# Patient Record
Sex: FEMALE | Race: WHITE
[De-identification: names, ages, dates, MRNs, and addresses within clinical notes are randomized per-mention and may not be internally consistent; named-entity substitution may affect disease eponyms.]

---

## 2017-04-18 ENCOUNTER — HOSPITAL ENCOUNTER (OUTPATIENT)
Dept: HOSPITAL 17 - NEPC | Age: 57
Setting detail: OBSERVATION
LOS: 1 days | Discharge: HOME | End: 2017-04-19
Attending: INTERNAL MEDICINE | Admitting: INTERNAL MEDICINE
Payer: COMMERCIAL

## 2017-04-18 VITALS
SYSTOLIC BLOOD PRESSURE: 156 MMHG | OXYGEN SATURATION: 96 % | HEART RATE: 97 BPM | DIASTOLIC BLOOD PRESSURE: 93 MMHG | RESPIRATION RATE: 18 BRPM

## 2017-04-18 VITALS
DIASTOLIC BLOOD PRESSURE: 105 MMHG | SYSTOLIC BLOOD PRESSURE: 193 MMHG | TEMPERATURE: 98.6 F | OXYGEN SATURATION: 98 % | HEART RATE: 85 BPM | RESPIRATION RATE: 12 BRPM

## 2017-04-18 VITALS
SYSTOLIC BLOOD PRESSURE: 202 MMHG | DIASTOLIC BLOOD PRESSURE: 99 MMHG | RESPIRATION RATE: 20 BRPM | HEART RATE: 86 BPM | TEMPERATURE: 98.6 F | OXYGEN SATURATION: 95 %

## 2017-04-18 VITALS — OXYGEN SATURATION: 99 %

## 2017-04-18 VITALS
DIASTOLIC BLOOD PRESSURE: 96 MMHG | TEMPERATURE: 98.2 F | SYSTOLIC BLOOD PRESSURE: 185 MMHG | RESPIRATION RATE: 18 BRPM | OXYGEN SATURATION: 99 % | HEART RATE: 78 BPM

## 2017-04-18 VITALS — BODY MASS INDEX: 32.6 KG/M2 | WEIGHT: 202.83 LBS | HEIGHT: 66 IN

## 2017-04-18 VITALS
HEART RATE: 92 BPM | OXYGEN SATURATION: 97 % | RESPIRATION RATE: 18 BRPM | SYSTOLIC BLOOD PRESSURE: 152 MMHG | DIASTOLIC BLOOD PRESSURE: 95 MMHG

## 2017-04-18 VITALS
SYSTOLIC BLOOD PRESSURE: 151 MMHG | DIASTOLIC BLOOD PRESSURE: 90 MMHG | OXYGEN SATURATION: 98 % | RESPIRATION RATE: 18 BRPM | HEART RATE: 86 BPM

## 2017-04-18 VITALS — DIASTOLIC BLOOD PRESSURE: 88 MMHG | SYSTOLIC BLOOD PRESSURE: 144 MMHG

## 2017-04-18 VITALS — HEART RATE: 96 BPM

## 2017-04-18 DIAGNOSIS — Z79.82: ICD-10-CM

## 2017-04-18 DIAGNOSIS — Z88.2: ICD-10-CM

## 2017-04-18 DIAGNOSIS — Z88.1: ICD-10-CM

## 2017-04-18 DIAGNOSIS — G89.29: ICD-10-CM

## 2017-04-18 DIAGNOSIS — Z82.49: ICD-10-CM

## 2017-04-18 DIAGNOSIS — K21.9: ICD-10-CM

## 2017-04-18 DIAGNOSIS — M79.7: ICD-10-CM

## 2017-04-18 DIAGNOSIS — M54.2: ICD-10-CM

## 2017-04-18 DIAGNOSIS — I25.10: ICD-10-CM

## 2017-04-18 DIAGNOSIS — R07.2: Primary | ICD-10-CM

## 2017-04-18 DIAGNOSIS — E03.9: ICD-10-CM

## 2017-04-18 DIAGNOSIS — I10: ICD-10-CM

## 2017-04-18 DIAGNOSIS — Z95.5: ICD-10-CM

## 2017-04-18 DIAGNOSIS — Z88.5: ICD-10-CM

## 2017-04-18 DIAGNOSIS — Z88.8: ICD-10-CM

## 2017-04-18 LAB
ALP SERPL-CCNC: 70 U/L (ref 45–117)
ALT SERPL-CCNC: 24 U/L (ref 10–53)
ANION GAP SERPL CALC-SCNC: 12 MEQ/L (ref 5–15)
APTT BLD: 27.5 SEC (ref 24.3–30.1)
AST SERPL-CCNC: 20 U/L (ref 15–37)
BASOPHILS # BLD AUTO: 0.1 TH/MM3 (ref 0–0.2)
BASOPHILS NFR BLD: 1.1 % (ref 0–2)
BILIRUB SERPL-MCNC: 0.3 MG/DL (ref 0.2–1)
BUN SERPL-MCNC: 8 MG/DL (ref 7–18)
CHLORIDE SERPL-SCNC: 103 MEQ/L (ref 98–107)
CK MB SERPL-MCNC: 2.4 NG/ML (ref 0.5–3.6)
CK MB SERPL-MCNC: 2.7 NG/ML (ref 0.5–3.6)
CK MB SERPL-MCNC: 3 NG/ML (ref 0.5–3.6)
CK SERPL-CCNC: 126 U/L (ref 26–192)
CK SERPL-CCNC: 163 U/L (ref 26–192)
CK SERPL-CCNC: 168 U/L (ref 26–192)
EOSINOPHIL # BLD: 0.1 TH/MM3 (ref 0–0.4)
EOSINOPHIL NFR BLD: 2.2 % (ref 0–4)
ERYTHROCYTE [DISTWIDTH] IN BLOOD BY AUTOMATED COUNT: 13.4 % (ref 11.6–17.2)
GFR SERPLBLD BASED ON 1.73 SQ M-ARVRAT: 92 ML/MIN (ref 89–?)
HCO3 BLD-SCNC: 24.3 MEQ/L (ref 21–32)
HCT VFR BLD CALC: 44.9 % (ref 35–46)
HEMO FLAGS: (no result)
INR PPP: 0.9 RATIO
LYMPHOCYTES # BLD AUTO: 3.2 TH/MM3 (ref 1–4.8)
LYMPHOCYTES NFR BLD AUTO: 48.1 % (ref 9–44)
MAGNESIUM SERPL-MCNC: 2.3 MG/DL (ref 1.5–2.5)
MCH RBC QN AUTO: 30.3 PG (ref 27–34)
MCHC RBC AUTO-ENTMCNC: 34.3 % (ref 32–36)
MCV RBC AUTO: 88.2 FL (ref 80–100)
MONOCYTES NFR BLD: 6.3 % (ref 0–8)
NEUTROPHILS # BLD AUTO: 2.8 TH/MM3 (ref 1.8–7.7)
NEUTROPHILS NFR BLD AUTO: 42.3 % (ref 16–70)
PLATELET # BLD: 272 TH/MM3 (ref 150–450)
POTASSIUM SERPL-SCNC: 3.4 MEQ/L (ref 3.5–5.1)
PROTHROMBIN TIME: 10 SEC (ref 9.8–11.6)
RBC # BLD AUTO: 5.09 MIL/MM3 (ref 4–5.3)
SODIUM SERPL-SCNC: 139 MEQ/L (ref 136–145)
WBC # BLD AUTO: 6.6 TH/MM3 (ref 4–11)

## 2017-04-18 PROCEDURE — 82552 ASSAY OF CPK IN BLOOD: CPT

## 2017-04-18 PROCEDURE — 85730 THROMBOPLASTIN TIME PARTIAL: CPT

## 2017-04-18 PROCEDURE — 78452 HT MUSCLE IMAGE SPECT MULT: CPT

## 2017-04-18 PROCEDURE — 84484 ASSAY OF TROPONIN QUANT: CPT

## 2017-04-18 PROCEDURE — 84443 ASSAY THYROID STIM HORMONE: CPT

## 2017-04-18 PROCEDURE — 85025 COMPLETE CBC W/AUTO DIFF WBC: CPT

## 2017-04-18 PROCEDURE — 82550 ASSAY OF CK (CPK): CPT

## 2017-04-18 PROCEDURE — 80053 COMPREHEN METABOLIC PANEL: CPT

## 2017-04-18 PROCEDURE — 83735 ASSAY OF MAGNESIUM: CPT

## 2017-04-18 PROCEDURE — 71010: CPT

## 2017-04-18 PROCEDURE — G0378 HOSPITAL OBSERVATION PER HR: HCPCS

## 2017-04-18 PROCEDURE — 70450 CT HEAD/BRAIN W/O DYE: CPT

## 2017-04-18 PROCEDURE — 85610 PROTHROMBIN TIME: CPT

## 2017-04-18 PROCEDURE — 99285 EMERGENCY DEPT VISIT HI MDM: CPT

## 2017-04-18 PROCEDURE — 93005 ELECTROCARDIOGRAM TRACING: CPT

## 2017-04-18 PROCEDURE — A9502 TC99M TETROFOSMIN: HCPCS

## 2017-04-18 PROCEDURE — 93017 CV STRESS TEST TRACING ONLY: CPT

## 2017-04-18 RX ADMIN — NITROGLYCERIN PRN MG: 0.4 TABLET SUBLINGUAL at 18:57

## 2017-04-18 RX ADMIN — NITROGLYCERIN PRN MG: 0.4 TABLET SUBLINGUAL at 18:43

## 2017-04-18 RX ADMIN — Medication SCH ML: at 22:17

## 2017-04-18 RX ADMIN — PANTOPRAZOLE SCH MG: 40 TABLET, DELAYED RELEASE ORAL at 22:19

## 2017-04-18 RX ADMIN — ACETAMINOPHEN PRN MG: 500 TABLET ORAL at 20:12

## 2017-04-18 RX ADMIN — NITROGLYCERIN PRN MG: 0.4 TABLET SUBLINGUAL at 18:51

## 2017-04-18 RX ADMIN — METOPROLOL TARTRATE SCH MG: 25 TABLET, FILM COATED ORAL at 22:19

## 2017-04-18 RX ADMIN — LOSARTAN POTASSIUM SCH MG: 50 TABLET, FILM COATED ORAL at 22:18
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
## 2017-04-18 NOTE — PD
Physical Exam


Time Seen by Provider:  13:22


Narrative


57 year old female presents to the ED for evaluation chest pain. Pain is 7/10, 

constant. She took nitro two times today and slightly helped the symptoms, but 

the pain persisted. She contacted Dr. Mejia to advised she come to ED. She 

reports that her BP has been higher over the last two weeks with associated 

headaches and a "bubble in her chest sensation." But the consistency and pain 

with minimal results after nitro caused her to be concerned. She has had 

cardiac stenting in the past as well as angioplasty as recent as Dec. 2016.  

Denies any other recent illness, fever, or chills. Dr. Henrry Thompson is her 

cardiologist





Data


Data


Last Documented VS





Vital Signs








  Date Time  Temp Pulse Resp B/P Pulse Ox O2 Delivery O2 Flow Rate FiO2


 


4/18/17 13:17 98.6 85 12 193/105 98   











MDM


Medical Record Reviewed:  Yes


Supervised Visit with DILLON:  No


Narrative Course


57 year old female presents to ED for evaluation of chest pain.  Appears 

without distress. Pt is hypertensive. EKG completed in triage. Once a medical 

bed becomes available, she will be transferred.


Condition:  Stable








Flaca Galo Apr 18, 2017 13:27

## 2017-04-18 NOTE — RADRPT
EXAM DATE/TIME:  04/18/2017 16:01 

 

[facility] COMPARISON:     

CHEST SINGLE AP, December 05, 2016, 9:39.

 

                     

INDICATIONS :     

Chest pain.

                     

 

MEDICAL HISTORY :            

coronary artery disease   

 

SURGICAL HISTORY :     

Coronary artery stent.   

 

ENCOUNTER:     

Initial                                        

 

ACUITY:     

1 day      

 

PAIN SCORE:     

8/10

 

LOCATION:     

Bilateral chest 

 

FINDINGS:     

A single view of the chest demonstrates the lungs to be symmetrically aerated without evidence of mas
s, infiltrate or effusion.  The cardiomediastinal contours are stable with tortuosity of the descendi
ng thoracic aorta.  Osseous structures are intact.

 

CONCLUSION:     The lungs are clear.

 

 

 

 Robert Serrano MD on April 18, 2017 at 16:40           

Board Certified Radiologist.

 This report was verified electronically.

## 2017-04-18 NOTE — EKG
Date Performed: 04/18/2017       Time Performed: 13:32:18

 

PTAGE:      57 years

 

EKG:      Sinus rhythm 

 

 LOW QRS VOLTAGE IN PRECORDIAL LEADS INFERIOR MYOCARDIAL INFARCTION ABNORMAL ECG

 

PREVIOUS TRACING       : 12/05/2016 09.32 Compared to prior tracing no significant change

 

DOCTOR:   Ben Keith  Interpretating Date/Time  04/18/2017 23:37:26

## 2017-04-18 NOTE — PD
Physical Exam


Date Seen by Provider:  Apr 18, 2017


Time Seen by Provider:  14:30


Narrative


57-year-old female with history of previous coronary artery disease status post 

stenting, family history of coronary artery disease and MIs early in life, 

presents to the ER today because she states that she started having substernal 

chest pains which she rates at a 7 out of 10 at its worse, took nitroglycerin 

and the chest pain improved only slightly to a 6 out of 10.  She denies any 

vomiting, fevers, shortness of breath, chills, or other symptoms.  She states 

that this discomfort feels like a "bubble in her chest".  She does not obey 

exacerbating alleviating factors.





Modifying Factors: None


Associated Signs & Symptoms: Chest discomfort


Risk Factors: Cardiac history





Past medical history includes hypertension, previous CAD status post stenting, 

gastroesophageal reflux, thyroid disease





PHYSICAL EXAM:


GENERAL: Middle-aged white female patient currently none acute distress.  Awake

, alert, oriented 3.  Well-developed.


SKIN: Focused skin assessment warm/dry.


HEAD: Atraumatic. Normocephalic. 


EYES: Pupils equal and round. No scleral icterus. No injection or drainage. 


ENT: No nasal bleeding or discharge.  Mucous membranes pink and moist.


NECK: Trachea midline. No JVD. 


CARDIOVASCULAR: Regular rate and rhythm.  No murmur appreciated.  Pulses are 

present and equal bilaterally.


RESPIRATORY: No accessory muscle use. Clear to auscultation. Breath sounds 

equal bilaterally. 


GASTROINTESTINAL: Abdomen soft, non-tender, nondistended. Hepatic and splenic 

margins not palpable. 


MUSCULOSKELETAL: No obvious deformities. No clubbing.  No cyanosis.  No edema. 


NEUROLOGICAL: Awake and alert. No obvious cranial nerve deficits.  Motor 

grossly within normal limits. Normal speech.


PSYCHIATRIC: Appropriate mood and affect; insight and judgment normal.





Data


Data


Last Documented VS





Vital Signs








  Date Time  Temp Pulse Resp B/P Pulse Ox O2 Delivery O2 Flow Rate FiO2


 


4/18/17 15:26     99  2 


 


4/18/17 15:23      Nasal Cannula  


 


4/18/17 13:17 98.6 85 12 193/105    








Orders





 Electrocardiogram (4/18/17 13:25)


Ckmb (Isoenzyme) Profile (4/18/17 14:58)


Complete Blood Count With Diff (4/18/17 14:58)


Comprehensive Metabolic Panel (4/18/17 14:58)


Magnesium (Mg) (4/18/17 14:58)


Prothrombin Time / Inr (Pt) (4/18/17 14:58)


Act Partial Throm Time (Ptt) (4/18/17 14:58)


Troponin I (4/18/17 14:58)


Chest, Single Ap (4/18/17 14:58)


Ecg Monitoring (4/18/17 14:58)


Bilateral Bp Monitoring (4/18/17 14:58)


Iv Access Insert/Monitor (4/18/17 14:58)


Oximetry (4/18/17 14:58)


Oxygen Administration (4/18/17 14:58)


Sodium Chloride 0.9% Flush (Ns Flush) (4/18/17 15:00)


Aspirin (Aspirin) (4/18/17 15:15)


CKMB (4/18/17 15:10)


CKMB% (4/18/17 15:10)


Admit Order (Ed Use Only) (4/18/17 16:56)





Labs





 Laboratory Tests








Test 4/18/17





 15:10


 


White Blood Count 6.6 TH/MM3


 


Red Blood Count 5.09 MIL/MM3


 


Hemoglobin 15.4 GM/DL


 


Hematocrit 44.9 %


 


Mean Corpuscular Volume 88.2 FL


 


Mean Corpuscular Hemoglobin 30.3 PG


 


Mean Corpuscular Hemoglobin 34.3 %





Concent 


 


Red Cell Distribution Width 13.4 %


 


Platelet Count 272 TH/MM3


 


Mean Platelet Volume 6.9 FL


 


Neutrophils (%) (Auto) 42.3 %


 


Lymphocytes (%) (Auto) 48.1 %


 


Monocytes (%) (Auto) 6.3 %


 


Eosinophils (%) (Auto) 2.2 %


 


Basophils (%) (Auto) 1.1 %


 


Neutrophils # (Auto) 2.8 TH/MM3


 


Lymphocytes # (Auto) 3.2 TH/MM3


 


Monocytes # (Auto) 0.4 TH/MM3


 


Eosinophils # (Auto) 0.1 TH/MM3


 


Basophils # (Auto) 0.1 TH/MM3


 


CBC Comment DIFF FINAL 


 


Differential Comment  


 


Prothrombin Time 10.0 SEC


 


Prothromb Time International 0.9 RATIO





Ratio 


 


Activated Partial 27.5 SEC





Thromboplast Time 


 


Sodium Level 139 MEQ/L


 


Potassium Level 3.4 MEQ/L


 


Chloride Level 103 MEQ/L


 


Carbon Dioxide Level 24.3 MEQ/L


 


Anion Gap 12 MEQ/L


 


Blood Urea Nitrogen 8 MG/DL


 


Creatinine 0.66 MG/DL


 


Estimat Glomerular Filtration 92 ML/MIN





Rate 


 


Random Glucose 75 MG/DL


 


Calcium Level 8.9 MG/DL


 


Magnesium Level 2.3 MG/DL


 


Total Bilirubin 0.3 MG/DL


 


Aspartate Amino Transf 20 U/L





(AST/SGOT) 


 


Alanine Aminotransferase 24 U/L





(ALT/SGPT) 


 


Alkaline Phosphatase 70 U/L


 


Total Creatine Kinase 168 U/L


 


Creatine Kinase MB 3.0 NG/ML


 


Troponin I LESS THAN 0.02





 NG/ML


 


Total Protein 8.0 GM/DL


 


Albumin 4.1 GM/DL











[facility]


Medical Record Reviewed:  Yes


Supervised Visit with DILLON:  No


Interpretation(s)


EKG shows NSR, no ST elevation or depression, and no arrhythmias.  No  

significant T-wave inversions.








Laboratory Tests








Test 4/18/17





 15:10


 


Hemoglobin 15.4 GM/DL





 (11.6-15.3)


 


Mean Platelet Volume 6.9 FL





 (7.0-11.0)


 


Lymphocytes (%) (Auto) 48.1 %





 (9.0-44.0)


 


Potassium Level 3.4 MEQ/L





 (3.5-5.1)


 


Troponin I LESS THAN 0.02





 NG/ML





 (0.02-0.05)








Last 24 hours Impressions








Chest X-Ray 4/18/17 3288 Signed





Impressions: 





 Service Date/Time:  Tuesday, April 18, 2017 16:01 - CONCLUSION: The lungs are 





 clear.     Robert Serrano MD 








Differential Diagnosis


ACS versus dysrhythmias versus anxiety versus GERD


Narrative Course


Cardiac enzymes and chest x-ray was unremarkable.  Patient is not in distress 

in the ER.  However, considering patient's history, my plan would be to admit 

the patient for further evaluation.  Planning to admit to chest pain center for 

further evaluation.


Diagnosis





 Primary Impression:  


 Chest pain





Admitting Information


Admitting Physician Requests:  Admit


Condition:  Stable








Oscar Lerma MD Apr 18, 2017 17:04

## 2017-04-19 VITALS
DIASTOLIC BLOOD PRESSURE: 92 MMHG | SYSTOLIC BLOOD PRESSURE: 170 MMHG | RESPIRATION RATE: 20 BRPM | OXYGEN SATURATION: 96 % | TEMPERATURE: 99 F | HEART RATE: 77 BPM

## 2017-04-19 VITALS
HEART RATE: 84 BPM | SYSTOLIC BLOOD PRESSURE: 148 MMHG | TEMPERATURE: 97.9 F | OXYGEN SATURATION: 95 % | RESPIRATION RATE: 20 BRPM | DIASTOLIC BLOOD PRESSURE: 92 MMHG

## 2017-04-19 VITALS
HEART RATE: 76 BPM | OXYGEN SATURATION: 96 % | RESPIRATION RATE: 20 BRPM | DIASTOLIC BLOOD PRESSURE: 93 MMHG | TEMPERATURE: 98.7 F | SYSTOLIC BLOOD PRESSURE: 177 MMHG

## 2017-04-19 VITALS — HEART RATE: 80 BPM

## 2017-04-19 VITALS
RESPIRATION RATE: 20 BRPM | TEMPERATURE: 97.3 F | HEART RATE: 76 BPM | OXYGEN SATURATION: 95 % | DIASTOLIC BLOOD PRESSURE: 92 MMHG | SYSTOLIC BLOOD PRESSURE: 170 MMHG

## 2017-04-19 VITALS — HEART RATE: 85 BPM

## 2017-04-19 RX ADMIN — METOPROLOL TARTRATE SCH MG: 25 TABLET, FILM COATED ORAL at 08:39

## 2017-04-19 RX ADMIN — ACETAMINOPHEN PRN MG: 500 TABLET ORAL at 06:02

## 2017-04-19 RX ADMIN — LOSARTAN POTASSIUM SCH MG: 50 TABLET, FILM COATED ORAL at 08:39

## 2017-04-19 RX ADMIN — Medication SCH ML: at 08:43

## 2017-04-19 RX ADMIN — PANTOPRAZOLE SCH MG: 40 TABLET, DELAYED RELEASE ORAL at 08:39

## 2017-04-19 NOTE — TR
Date Performed: 04/19/2017       Time Performed: 10:04:05

 

DOCTOR:      Roberto Anguiano 

 

DRUG LIST:     

CLINICAL HISTORY:      ANGINA

REASON FOR TEST:      Angina

REASON FOR ENDING:     

OBSERVATION:     

CONCLUSION:      Lexiscan stress test was performed under standard four minute protocol.  Radionuclid
e was injected one minute prior to ending the test. No electrocardiographic abormalities were present
 to suggest ischemia. Nuclear imaging and interpretation are pending.

COMMENTS:

## 2017-04-19 NOTE — HHI.DCPOC
Discharge Care Plan


Diagnosis:  


(1) Chest pain


(2) Coronary artery disease


(3) H/O heart artery stent


(4) Hypertension


(5) Hypothyroidism


Goals to Promote Your Health


DISCUSS THE NEED FOR CHOLESTEROL MEDICATIONS WITH YOUR DOCTOR.





* To prevent worsening of your condition and complications


* To maintain your health at the optimal level


Directions to Meet Your Goals


*** Take your medications as prescribed


*** Follow your dietary instruction


*** Follow activity as directed








*** Keep your appointments as scheduled


*** Take your immunizations and boosters as scheduled


*** If your symptoms worsen call your PCP, if no PCP go to Urgent Care Center 

or Emergency Room***


*** Smoking is Dangerous to Your Health. Avoid second hand smoke***


***Call the 24-hour hour crisis hotline for domestic abuse at 1-425.689.1231***








Willy Leone Apr 19, 2017 12:04

## 2017-04-19 NOTE — EKG
Date Performed: 04/18/2017       Time Performed: 18:01:30

 

PTAGE:      57 years

 

EKG:      Sinus rhythm 

 

 MODERATE VOLTAGE CRITERIA FOR LVH, CONSIDER NORMAL VARIANT BORDERLINE ECG

 

PREVIOUS TRACING       : 04/18/2017 13.32 Since previous tracing, no significant change noted

 

DOCTOR:   Roberto Anguiano  Interpretating Date/Time  04/19/2017 12:52:30

## 2017-04-19 NOTE — RADRPT
EXAM DATE/TIME:  04/19/2017 09:14 

 

[facility] COMPARISON:     

No previous studies available for comparison.

 

 

INDICATIONS :     

Cephalgia.

                      

 

RADIATION DOSE:     

35.01 CTDIvol (mGy) 

 

 

 

MEDICAL HISTORY :     

Hypertension. Diabetes mellitus type 1. Dizziness

 

SURGICAL HISTORY :       

 

ENCOUNTER:      

Initial

 

ACUITY:      

1 day

 

PAIN SCALE:      

1/10

 

LOCATION:         

 

TECHNIQUE:     

Multiple contiguous axial images were obtained of the head.  Using automated exposure control and adj
ustment of the mA and/or kV according to patient size, radiation dose was kept as low as reasonably a
chievable to obtain optimal diagnostic quality images. 

 

FINDINGS:     

 

CEREBRUM:     

The ventricles are normal for age.  No evidence of midline shift, mass lesion, hemorrhage or acute in
farction.  No extra-axial fluid collections are seen.

 

POSTERIOR FOSSA:     

The cerebellum and brainstem are intact.  The 4th ventricle is midline.  The cerebellopontine angle i
s unremarkable.

 

EXTRACRANIAL:     

The visualized portion of the orbits is intact.

 

SKULL:     

The calvaria is intact.  No evidence of skull fracture.

 

CONCLUSION:     

Negative noncontrast CT brain.

 

 

 

 Robert Serrano MD on April 19, 2017 at 9:24           

Board Certified Radiologist.

 This report was verified electronically.

## 2017-04-19 NOTE — EKG
Date Performed: 04/18/2017       Time Performed: 21:08:08

 

PTAGE:      57 years

 

EKG:      Sinus rhythm 

 

 MINIMAL VOLTAGE CRITERIA FOR LVH, CONSIDER NORMAL VARIANT NONSPECIFIC T-WAVE ABNORMALITY BORDERLINE 
ECG 

 

NO PREVIOUS TRACING            

 

DOCTOR:   Roberto Anguiano  Interpretating Date/Time  04/19/2017 12:52:05

## 2017-04-19 NOTE — RADRPT
EXAM DATE/TIME:  2017 09:16 

 

[facility] COMPARISON:     

No previous studies available for comparison.

 

 

INDICATIONS :     

Left sided chest pain. Angina. Coronary artery disease.

                           

 

DOSE:     

26.2 mCi Tc99m Myoview at stress.

                     8.5 mCi Tc99m Myoview at rest.

                     0.4 mg Lexiscan

                       

 

 

STRESS SYMPTOMS:      

Shortness of breath with chest pain and nausea.

                       

 

 

EJECTION FRACTION:       

65%

                       

 

MEDICAL HISTORY :     

Hypertension.   

 

SURGICAL HISTORY :      

Coronary artery stent.  section. Inguinal hernia repair. Discectomy.

 

ENCOUNTER:     

Initial

 

ACUITY:     

1 day

 

PAIN SCALE:     

2/10

 

LOCATION:      

Left chest 

 

TECHNIQUE:     

The patient underwent pharmacologic stress with infusion of prescribed dose.  Continuous ECG tracing 
was monitored during stress.  Gated SPECT imaging was performed after stress and conventional SPECT i
maging was performed at rest.  The examination was performed on a SPECT/CT scanner, both attenuation 
and non-corrected datasets were reviewed.

 

FINDINGS:     

 

DISTRIBUTION:     

The maximum perfused segment at stress is in the anterior wall.

 

PERFUSION STUDY:     

There is a small size mild severity perfusion defect seen in the apical segment of the lateral wall w
ith decrease in perfusion approximately 30%.  Review of raw data images demonstrates a different tamanna
palmira of breast attenuation to the apical segment between rest and stress in the changed appearance cou
ld be due to differential attenuation effects.  No other perfusion defects seen.  The summed stress s
core is one.

 

GATED STUDY:     

There is intact wall motion and thickening without hypokinetic or dyskinetic segments. 

 

CONCLUSION:     

1. No definite evidence of stress-induced ischemia.  Small mild decrease in perfusion to the apical l
ateral wall is probably due to breast attenuation effects.

2. Intact wall motion 65% ejection fraction.

 

RISK CATEGORY:     Low (<1% Annual Mortality Rate)

 

 

 

 

 Robert Serrano MD on 2017 at 11:34           

Board Certified Radiologist.

 This report was verified electronically.

## 2017-05-15 ENCOUNTER — HOSPITAL ENCOUNTER (OUTPATIENT)
Dept: HOSPITAL 17 - NEPE | Age: 57
Setting detail: OBSERVATION
LOS: 1 days | Discharge: HOME | End: 2017-05-16
Attending: HOSPITALIST | Admitting: HOSPITALIST
Payer: COMMERCIAL

## 2017-05-15 VITALS
SYSTOLIC BLOOD PRESSURE: 135 MMHG | HEART RATE: 109 BPM | RESPIRATION RATE: 20 BRPM | OXYGEN SATURATION: 96 % | DIASTOLIC BLOOD PRESSURE: 89 MMHG | TEMPERATURE: 98.9 F

## 2017-05-15 VITALS
RESPIRATION RATE: 16 BRPM | DIASTOLIC BLOOD PRESSURE: 81 MMHG | HEART RATE: 90 BPM | SYSTOLIC BLOOD PRESSURE: 136 MMHG | OXYGEN SATURATION: 96 %

## 2017-05-15 VITALS
TEMPERATURE: 100.1 F | DIASTOLIC BLOOD PRESSURE: 68 MMHG | OXYGEN SATURATION: 97 % | HEART RATE: 98 BPM | SYSTOLIC BLOOD PRESSURE: 138 MMHG | RESPIRATION RATE: 20 BRPM

## 2017-05-15 VITALS
DIASTOLIC BLOOD PRESSURE: 73 MMHG | SYSTOLIC BLOOD PRESSURE: 144 MMHG | RESPIRATION RATE: 17 BRPM | HEART RATE: 89 BPM | OXYGEN SATURATION: 96 %

## 2017-05-15 VITALS
DIASTOLIC BLOOD PRESSURE: 74 MMHG | SYSTOLIC BLOOD PRESSURE: 131 MMHG | OXYGEN SATURATION: 98 % | RESPIRATION RATE: 16 BRPM | HEART RATE: 88 BPM

## 2017-05-15 VITALS — OXYGEN SATURATION: 96 %

## 2017-05-15 VITALS — HEART RATE: 71 BPM

## 2017-05-15 DIAGNOSIS — E78.5: ICD-10-CM

## 2017-05-15 DIAGNOSIS — I10: ICD-10-CM

## 2017-05-15 DIAGNOSIS — E55.9: ICD-10-CM

## 2017-05-15 DIAGNOSIS — J30.9: ICD-10-CM

## 2017-05-15 DIAGNOSIS — M79.7: ICD-10-CM

## 2017-05-15 DIAGNOSIS — I25.10: ICD-10-CM

## 2017-05-15 DIAGNOSIS — R42: ICD-10-CM

## 2017-05-15 DIAGNOSIS — R59.1: ICD-10-CM

## 2017-05-15 DIAGNOSIS — E03.9: ICD-10-CM

## 2017-05-15 DIAGNOSIS — R55: ICD-10-CM

## 2017-05-15 DIAGNOSIS — R06.02: ICD-10-CM

## 2017-05-15 DIAGNOSIS — F41.9: ICD-10-CM

## 2017-05-15 DIAGNOSIS — E66.9: ICD-10-CM

## 2017-05-15 DIAGNOSIS — R00.2: Primary | ICD-10-CM

## 2017-05-15 DIAGNOSIS — R07.9: ICD-10-CM

## 2017-05-15 DIAGNOSIS — Z79.82: ICD-10-CM

## 2017-05-15 DIAGNOSIS — K21.9: ICD-10-CM

## 2017-05-15 DIAGNOSIS — M54.12: ICD-10-CM

## 2017-05-15 DIAGNOSIS — M48.02: ICD-10-CM

## 2017-05-15 DIAGNOSIS — Z98.1: ICD-10-CM

## 2017-05-15 LAB
ALP SERPL-CCNC: 59 U/L (ref 45–117)
ALT SERPL-CCNC: 30 U/L (ref 10–53)
ANION GAP SERPL CALC-SCNC: 8 MEQ/L (ref 5–15)
APTT BLD: 24.5 SEC (ref 24.3–30.1)
AST SERPL-CCNC: 23 U/L (ref 15–37)
BACTERIA #/AREA URNS HPF: (no result) /HPF
BASOPHILS # BLD AUTO: 0 TH/MM3 (ref 0–0.2)
BASOPHILS NFR BLD: 0.3 % (ref 0–2)
BILIRUB SERPL-MCNC: 0.4 MG/DL (ref 0.2–1)
BUN SERPL-MCNC: 9 MG/DL (ref 7–18)
CHLORIDE SERPL-SCNC: 107 MEQ/L (ref 98–107)
CK SERPL-CCNC: 84 U/L (ref 26–192)
COLOR UR: YELLOW
COMMENT (UR): (no result)
CULTURE IF INDICATED: (no result)
EOSINOPHIL # BLD: 0 TH/MM3 (ref 0–0.4)
EOSINOPHIL NFR BLD: 0.7 % (ref 0–4)
ERYTHROCYTE [DISTWIDTH] IN BLOOD BY AUTOMATED COUNT: 12.9 % (ref 11.6–17.2)
GFR SERPLBLD BASED ON 1.73 SQ M-ARVRAT: 111 ML/MIN (ref 89–?)
GLUCOSE UR STRIP-MCNC: (no result) MG/DL
HCO3 BLD-SCNC: 25.9 MEQ/L (ref 21–32)
HCT VFR BLD CALC: 37.9 % (ref 35–46)
HEMO FLAGS: (no result)
HGB UR QL STRIP: (no result)
INR PPP: 0.9 RATIO
KETONES UR STRIP-MCNC: (no result) MG/DL
LYMPHOCYTES # BLD AUTO: 1.7 TH/MM3 (ref 1–4.8)
LYMPHOCYTES NFR BLD AUTO: 25.8 % (ref 9–44)
MAGNESIUM SERPL-MCNC: 2.3 MG/DL (ref 1.5–2.5)
MCH RBC QN AUTO: 29.6 PG (ref 27–34)
MCHC RBC AUTO-ENTMCNC: 33.7 % (ref 32–36)
MCV RBC AUTO: 87.8 FL (ref 80–100)
MONOCYTES NFR BLD: 6.1 % (ref 0–8)
NEUTROPHILS # BLD AUTO: 4.5 TH/MM3 (ref 1.8–7.7)
NEUTROPHILS NFR BLD AUTO: 67.1 % (ref 16–70)
NITRITE UR QL STRIP: (no result)
PLATELET # BLD: 311 TH/MM3 (ref 150–450)
POTASSIUM SERPL-SCNC: 3.6 MEQ/L (ref 3.5–5.1)
PROTHROMBIN TIME: 10.3 SEC (ref 9.8–11.6)
RBC # BLD AUTO: 4.32 MIL/MM3 (ref 4–5.3)
SODIUM SERPL-SCNC: 141 MEQ/L (ref 136–145)
SP GR UR STRIP: 1.01 (ref 1–1.03)
T3FREE SERPL-MCNC: 2.16 PG/ML (ref 2.18–3.98)
T4 FREE SERPL-MCNC: 1.23 NG/DL (ref 0.76–1.46)
WBC # BLD AUTO: 6.8 TH/MM3 (ref 4–11)

## 2017-05-15 PROCEDURE — 74177 CT ABD & PELVIS W/CONTRAST: CPT

## 2017-05-15 PROCEDURE — 85025 COMPLETE CBC W/AUTO DIFF WBC: CPT

## 2017-05-15 PROCEDURE — 99285 EMERGENCY DEPT VISIT HI MDM: CPT

## 2017-05-15 PROCEDURE — 84484 ASSAY OF TROPONIN QUANT: CPT

## 2017-05-15 PROCEDURE — 81001 URINALYSIS AUTO W/SCOPE: CPT

## 2017-05-15 PROCEDURE — 93005 ELECTROCARDIOGRAM TRACING: CPT

## 2017-05-15 PROCEDURE — 80053 COMPREHEN METABOLIC PANEL: CPT

## 2017-05-15 PROCEDURE — G0378 HOSPITAL OBSERVATION PER HR: HCPCS

## 2017-05-15 PROCEDURE — 85610 PROTHROMBIN TIME: CPT

## 2017-05-15 PROCEDURE — 84443 ASSAY THYROID STIM HORMONE: CPT

## 2017-05-15 PROCEDURE — 85730 THROMBOPLASTIN TIME PARTIAL: CPT

## 2017-05-15 PROCEDURE — 83735 ASSAY OF MAGNESIUM: CPT

## 2017-05-15 PROCEDURE — 84481 FREE ASSAY (FT-3): CPT

## 2017-05-15 PROCEDURE — 71010: CPT

## 2017-05-15 PROCEDURE — 82550 ASSAY OF CK (CPK): CPT

## 2017-05-15 PROCEDURE — 71275 CT ANGIOGRAPHY CHEST: CPT

## 2017-05-15 PROCEDURE — 84439 ASSAY OF FREE THYROXINE: CPT

## 2017-05-15 PROCEDURE — 85379 FIBRIN DEGRADATION QUANT: CPT

## 2017-05-15 RX ADMIN — METOPROLOL TARTRATE SCH MG: 25 TABLET, FILM COATED ORAL at 19:54

## 2017-05-15 RX ADMIN — PANTOPRAZOLE SCH MG: 40 TABLET, DELAYED RELEASE ORAL at 19:55

## 2017-05-15 RX ADMIN — LOSARTAN POTASSIUM SCH MG: 50 TABLET, FILM COATED ORAL at 19:55

## 2017-05-15 NOTE — RADRPT
EXAM DATE/TIME:  05/15/2017 15:33 

 

[facility] COMPARISON:     

No previous studies available for comparison.

 

 

INDICATIONS :     

Left sided chest pain for one month.

                      

 

IV CONTRAST:     

50 cc Omnipaque 350 (iohexol) IV 

 

 

RADIATION DOSE:     

12.88 CTDIvol (mGy) 

 

 

MEDICAL HISTORY :     

Hypertension.  coronary artery disease

 

SURGICAL HISTORY :       

cardiac catheterization

 

ENCOUNTER:      

Initial

 

ACUITY:      

1 month

 

PAIN SCALE:      

5/10

 

LOCATION:       

Left chest 

 

TECHNIQUE:     

Volumetric scanning of the chest was performed using a pulmonary embolism protocol MIP images were re
constructed.  Using automated exposure control and adjustment of the mA and/or kV according to patien
t size, radiation dose was kept as low as reasonably achievable to obtain optimal diagnostic quality 
images. 

 

FINDINGS:     

There are no suspicious lung lesions identified.

The heart is minimally enlarged.

There is no axillary or mediastinal adenopathy

There is no evidence for central pulmonary emboli

Stent is present in the LAD

The portion of the liver and spleen identify are free of focal defects.

 

CONCLUSION:     Negative for pulmonary emboli.  History of cardiac stent.  

 

 

 Maged Llanos MD FACR on May 15, 2017 at 15:44           

Board Certified Radiologist.

 This report was verified electronically.

## 2017-05-15 NOTE — HHI.HP
HPI


Service


[facility]


Primary Care Physician


Pranay Hernandez M.D.


Admission Diagnosis


palpitation


Chief Complaint:  


palpitation


Travel History


International Travel<30 Days:  No


Contact w/Intl Traveler <30 Da:  No


Traveled to Known Affected Are:  No


History of Present Illness


Pt is 58 yo with cad/lad stent who presents with recurrent episodes of 

palpitations


and then chest pressure. she usually takes ntg for this with some relief. She 

had


[facility] x 2 in [id number] with lad ganesh. Then in 12/16 [facility] patent coronaries and stent. 

Last


month admitted to [facility] and had lexiscan. This was felt to show artifact in 

apical region


and less likely ischemia. Pt says these sx's have been on/off and her pcp 

evaluated


her adrenal. She also mentions that endocrinology is searching for the answers


and she is scheduled for thyroid u/s. Pt says bp meds recently increased.


She was not comfortably going home tonight. denies syncope but did have


dizziness during the palpitations.





Review of Systems


Other


palpitation with some cp


dizziness





Past Family Social History


Past Medical History


cad, lad ganesh 2014


[facility] 12/16 neg for obstruction


lexiscan 4/17. no definite ischemia


hypothyroidism


umbilical hernia surgery


c section


c5/6 spinal stenosis s/p lami/fusion


Reported Medications





Hydralazine (Hydralazine HCl) 25 Mg Tab 25 Mg PO TID


     Take with a meal


Gabapentin 600 Mg Tab 1,200 Mg PO HS


Gabapentin 600 Mg Tab 600 Mg PO DAILY


Losartan (Losartan Potassium) 50 Mg Tab 50 Mg PO BID


Tylenol Extra Strength (Acetaminophen) 500 Mg Tab 1,000 Mg PO Q4-6H PRN


Metoprolol Tartrate 25 Mg Tab 75 Mg PO BID


Levothyroxine (Levothyroxine Sodium) 75 Mcg Tab 75 Mcg PO DAILY


Lansoprazole 30 Mg Capdr 30 Mg PO BID


Flonase Nasal Spray (Fluticasone Nasal Spray) 50 Mcg/Act Spray 2 Spray EACH 

NARE DAILY PRN


Allegra Allergy (Fexofenadine HCl) 180 Mg Tab 180 Mg PO DAILY PRN


Aviane (Levonorgestrel-Ethinyl Estradiol) 0.1-20 Mg-Mcg Tab 1 Tab PO DAILY


Aspirin Adult Low Strength (Aspirin) 81 Mg Tabdr 81 Mg PO HS


Allergies:  


Coded Allergies:  


     Cipro (Unverified  Allergy, Severe, 5/15/17)


     Darvocet-N 100 (Unverified  Allergy, Severe, 5/15/17)


     Procardia (Unverified  Allergy, Severe, 5/15/17)


     Sulfa (Verified  Allergy, Intermediate, HIVES, 5/15/17)


     Zanaflex (Verified  Allergy, Intermediate, VOMITING, 5/15/17)


Family History


nc


Social History


no etoh/tob





Physical Exam


Vital Signs


heent neg


heart reg


lung cta


abd s/nt


ext no edema


 Vital Signs








  Date Time  Temp Pulse Resp B/P Pulse Ox O2 Delivery O2 Flow Rate FiO2


 


5/15/17 16:41  90 16 136/81 96   


 


5/15/17 14:32  88 16 131/74 98   


 


5/15/17 12:11     96 Room Air  


 


5/15/17 12:11  92    Room Air  


 


5/15/17 11:15  89 17 144/73 96 Room Air  


 


5/15/17 10:45 98.9 109 20 135/89 96 Room Air  








Laboratory





Laboratory Tests








Test 5/15/17





 11:30


 


White Blood Count 6.8 


 


Red Blood Count 4.32 


 


Hemoglobin 12.8 


 


Hematocrit 37.9 


 


Mean Corpuscular Volume 87.8 


 


Mean Corpuscular Hemoglobin 29.6 


 


Mean Corpuscular Hemoglobin 33.7 





Concent 


 


Red Cell Distribution Width 12.9 


 


Platelet Count 311 


 


Mean Platelet Volume 7.0 


 


Neutrophils (%) (Auto) 67.1 


 


Lymphocytes (%) (Auto) 25.8 


 


Monocytes (%) (Auto) 6.1 


 


Eosinophils (%) (Auto) 0.7 


 


Basophils (%) (Auto) 0.3 


 


Neutrophils # (Auto) 4.5 


 


Lymphocytes # (Auto) 1.7 


 


Monocytes # (Auto) 0.4 


 


Eosinophils # (Auto) 0.0 


 


Basophils # (Auto) 0.0 


 


CBC Comment DIFF FINAL 


 


Differential Comment  


 


Prothrombin Time 10.3 


 


Prothromb Time International 0.9 





Ratio 


 


Activated Partial 24.5 





Thromboplast Time 


 


D-Dimer Quantitative (PE/DVT) 1.09 


 


Urine Color YELLOW 


 


Urine Turbidity CLEAR 


 


Urine pH 7.0 


 


Urine Specific Gravity 1.008 


 


Urine Protein NEG 


 


Urine Glucose (UA) NEG 


 


Urine Ketones NEG 


 


Urine Occult Blood NEG 


 


Urine Nitrite NEG 


 


Urine Bilirubin NEG 


 


Urine Urobilinogen LESS THAN 2.0 


 


Urine Leukocyte Esterase NEG 


 


Urine RBC LESS THAN 1 


 


Urine WBC LESS THAN 1 


 


Urine Bacteria RARE 


 


Microscopic Urinalysis Comment CULT NOT





 INDICATED


 


Sodium Level 141 


 


Potassium Level 3.6 


 


Chloride Level 107 


 


Carbon Dioxide Level 25.9 


 


Anion Gap 8 


 


Blood Urea Nitrogen 9 


 


Creatinine 0.56 


 


Estimat Glomerular Filtration 111 





Rate 


 


Random Glucose 97 


 


Calcium Level 8.6 


 


Magnesium Level 2.3 


 


Total Bilirubin 0.4 


 


Aspartate Amino Transf 23 





(AST/SGOT) 


 


Alanine Aminotransferase 30 





(ALT/SGPT) 


 


Alkaline Phosphatase 59 


 


Total Protein 6.8 


 


Albumin 3.6 


 


Free Thyroxine 1.23 


 


Free Triiodothyronine (T3) 2.16 





pg/dL 


 


Thyroid Stimulating Hormone 0.709 





3rd Gen 








Result Diagram:  


5/15/17 1130                                                                   

             5/15/17 1130








Assessment and Plan


Problem List:  


(1) Palpitations


Status:  Acute


Plan:  Pt is 58 yo with cad/lad ganesh 2014


[facility] 12/16. no obstruction


lexiscan 4/17 no definite ischemia


c/o palpitations-recurrent with cp





consult her cardiologist


telemetry overnight to monitor for arrhythmia


?30 holter..?loop recorder


cont home meds





(2) Coronary artery disease


Status:  Chronic


(3) Hypertension


Status:  Chronic


(4) Hypothyroidism


Status:  Chronic








Sergio Aguilar MD May 15, 2017 18:03

## 2017-05-15 NOTE — PD
HPI


Chief Complaint:  Cardiac Complaint


Time Seen by Provider:  11:22


Travel History


International Travel<30 days:  No


Contact w/Intl Traveler<30days:  No


Traveled to known affect area:  No





History of Present Illness


HPI


57-year-old female patient with history of hypertension, CAD with stenting, 

recent admission for cardiac evaluation of syncope and chest pains, presents 

back to the ER today because she has had 2 days history of dizziness, chest 

discomfort, feels like she is about to pass out, and states she had flulike 

symptoms last week as well.  She has been coughing with green phlegm, having 

fevers but states the fevers have been settling down.  She denies any current 

vomiting, diarrhea,, or any other symptoms.





Modifying Factors: None


Associated Signs & Symptoms: Chest discomfort, dizziness, near syncope


Risk Factors: Recent flulike symptoms and coughing, previous episodes of syncope





PFSH


Past Medical History


Hx Anticoagulant Therapy:  Yes (ASA)


Blood Disorders:  No


Heart Rhythm Problems:  Yes (CAD)


Cancer:  No


Cardiac Catheterization:  Yes ()


Cardiovascular Problems:  Yes (HTN,CAD)


High Cholesterol:  Yes


Congestive Heart Failure:  No


Coronary Artery Disease:  Yes


Diminished Hearing:  No


Endocrine:  Yes


Fibromyalgia:  Yes


Gastrointestinal Disorders:  Yes


GERD:  Yes


Genitourinary:  No


Headaches:  Yes


Heparin Induced Thrombocytopen:  No


Hypertension:  Yes


Immune Disorder:  No


Musculoskeletal:  Yes


Neurologic:  Yes


Psychiatric:  No


Reproductive:  No


Respiratory:  No


Thyroid Disease:  Yes





Past Surgical History


Abdominal Surgery:  Yes (99' UMBILICAL HERNIA REPAIR)


Cardiac Surgery:  Yes (1 STENT)


 Section:  Yes (X 1)


Coronary Artery Bypass Graft:  No


Gynecologic Surgery:  Yes (96' )


Oral Surgery:  Yes (WISDOM TEETH AGE 17)


Pacemaker:  No


Other Surgery:  Yes





Family History


Family Myocardial Infarction:  No





Social History


Alcohol Use:  No


Tobacco Use:  No


Substance Use:  No





Allergies-Medications


(Allergen,Severity, Reaction):  


Coded Allergies:  


     Cipro (Unverified  Allergy, Severe, 5/15/17)


     Darvocet-N 100 (Unverified  Allergy, Severe, 5/15/17)


     Procardia (Unverified  Allergy, Severe, 5/15/17)


     Sulfa (Verified  Allergy, Intermediate, HIVES, 5/15/17)


     Zanaflex (Verified  Allergy, Intermediate, VOMITING, 5/15/17)


Reported Meds & Prescriptions





Reported Meds & Active Scripts


Active


Reported


Hydralazine (Hydralazine HCl) 25 Mg Tab 25 Mg PO TID


     Take with a meal


Gabapentin 600 Mg Tab 1,200 Mg PO HS


Gabapentin 600 Mg Tab 600 Mg PO DAILY


Losartan (Losartan Potassium) 50 Mg Tab 50 Mg PO BID


Tylenol Extra Strength (Acetaminophen) 500 Mg Tab 1,000 Mg PO Q4-6H PRN


Metoprolol Tartrate 25 Mg Tab 75 Mg PO BID


Levothyroxine (Levothyroxine Sodium) 75 Mcg Tab 75 Mcg PO DAILY


Lansoprazole 30 Mg Capdr 30 Mg PO BID


Flonase Nasal Spray (Fluticasone Nasal Spray) 50 Mcg/Act Spray 2 Spray EACH 

NARE DAILY PRN


Allegra Allergy (Fexofenadine HCl) 180 Mg Tab 180 Mg PO DAILY PRN


Aviane (Levonorgestrel-Ethinyl Estradiol) 0.1-20 Mg-Mcg Tab 1 Tab PO DAILY


Aspirin Adult Low Strength (Aspirin) 81 Mg Tabdr 81 Mg PO HS








Review of Systems


Except as stated in HPI:  all other systems reviewed are Neg





Physical Exam


Narrative


GENERAL: Well-developed middle age white female patient in mild distress.  

Awake and oriented 3.


SKIN: Focused skin assessment warm/dry.


HEAD: Atraumatic. Normocephalic. 


EYES: Pupils equal and round. No scleral icterus. No injection or drainage. 


ENT: No nasal bleeding or discharge.  Mucous membranes pink and moist.


NECK: Trachea midline. No JVD. 


CARDIOVASCULAR: Regular rate and rhythm.  No murmur appreciated.


RESPIRATORY: No accessory muscle use. Clear to auscultation. Breath sounds 

equal bilaterally. 


GASTROINTESTINAL: Abdomen soft, non-tender, nondistended. Hepatic and splenic 

margins not palpable. 


MUSCULOSKELETAL: No obvious deformities. No clubbing.  No cyanosis.  No edema. 


NEUROLOGICAL: Awake and alert. No obvious cranial nerve deficits.  Motor 

grossly within normal limits. Normal speech.


PSYCHIATRIC: Appropriate mood and affect; insight and judgment normal.





Data


Data


Last Documented VS





Vital Signs








  Date Time  Temp Pulse Resp B/P Pulse Ox O2 Delivery O2 Flow Rate FiO2


 


5/15/17 16:41  90 16 136/81 96   


 


5/15/17 12:11      Room Air  


 


5/15/17 10:45 98.9       








Orders





 Electrocardiogram (5/15/17 11:11)


Complete Blood Count With Diff (5/15/17 11:11)


Comprehensive Metabolic Panel (5/15/17 11:11)


Magnesium (Mg) (5/15/17 11:11)


Urinalysis - C+S If Indicated (5/15/17 11:11)


Ecg Monitoring (5/15/17 11:11)


Iv Access Insert/Monitor (5/15/17 11:11)


Oximetry (5/15/17 11:11)


Sodium Chloride 0.9% Flush (Ns Flush) (5/15/17 11:15)


Chest, Single Ap (5/15/17 11:22)


Thyroid Stimulating Hormone (5/15/17 11:23)


Free Thyroxine (T4) (5/15/17 11:23)


Free T3 (5/15/17 11:23)


Prothrombin Time / Inr (Pt) (5/15/17 11:32)


Act Partial Throm Time (Ptt) (5/15/17 11:32)


D-Dimer (5/15/17 11:32)


Ct Pulmonary Angiogram (5/15/17 12:36)


Iohexol 350 Inj (Omnipaque 350 Inj) (5/15/17 15:34)





Labs





 Laboratory Tests








Test 5/15/17





 11:30


 


White Blood Count 6.8 TH/MM3


 


Red Blood Count 4.32 MIL/MM3


 


Hemoglobin 12.8 GM/DL


 


Hematocrit 37.9 %


 


Mean Corpuscular Volume 87.8 FL


 


Mean Corpuscular Hemoglobin 29.6 PG


 


Mean Corpuscular Hemoglobin 33.7 %





Concent 


 


Red Cell Distribution Width 12.9 %


 


Platelet Count 311 TH/MM3


 


Mean Platelet Volume 7.0 FL


 


Neutrophils (%) (Auto) 67.1 %


 


Lymphocytes (%) (Auto) 25.8 %


 


Monocytes (%) (Auto) 6.1 %


 


Eosinophils (%) (Auto) 0.7 %


 


Basophils (%) (Auto) 0.3 %


 


Neutrophils # (Auto) 4.5 TH/MM3


 


Lymphocytes # (Auto) 1.7 TH/MM3


 


Monocytes # (Auto) 0.4 TH/MM3


 


Eosinophils # (Auto) 0.0 TH/MM3


 


Basophils # (Auto) 0.0 TH/MM3


 


CBC Comment DIFF FINAL 


 


Differential Comment  


 


Prothrombin Time 10.3 SEC


 


Prothromb Time International 0.9 RATIO





Ratio 


 


Activated Partial 24.5 SEC





Thromboplast Time 


 


D-Dimer Quantitative (PE/DVT) 1.09 MG/L FEU


 


Urine Color YELLOW 


 


Urine Turbidity CLEAR 


 


Urine pH 7.0 


 


Urine Specific Gravity 1.008 


 


Urine Protein NEG mg/dL


 


Urine Glucose (UA) NEG mg/dL


 


Urine Ketones NEG mg/dL


 


Urine Occult Blood NEG 


 


Urine Nitrite NEG 


 


Urine Bilirubin NEG 


 


Urine Urobilinogen LESS THAN 2.0





 MG/DL


 


Urine Leukocyte Esterase NEG 


 


Urine RBC LESS THAN 1





 /hpf


 


Urine WBC LESS THAN 1





 /hpf


 


Urine Bacteria RARE /hpf


 


Microscopic Urinalysis Comment CULT NOT





 INDICATED


 


Sodium Level 141 MEQ/L


 


Potassium Level 3.6 MEQ/L


 


Chloride Level 107 MEQ/L


 


Carbon Dioxide Level 25.9 MEQ/L


 


Anion Gap 8 MEQ/L


 


Blood Urea Nitrogen 9 MG/DL


 


Creatinine 0.56 MG/DL


 


Estimat Glomerular Filtration 111 ML/MIN





Rate 


 


Random Glucose 97 MG/DL


 


Calcium Level 8.6 MG/DL


 


Magnesium Level 2.3 MG/DL


 


Total Bilirubin 0.4 MG/DL


 


Aspartate Amino Transf 23 U/L





(AST/SGOT) 


 


Alanine Aminotransferase 30 U/L





(ALT/SGPT) 


 


Alkaline Phosphatase 59 U/L


 


Total Protein 6.8 GM/DL


 


Albumin 3.6 GM/DL


 


Free Thyroxine 1.23 NG/DL


 


Free Triiodothyronine (T3) 2.16 PG/ML





pg/dL 


 


Thyroid Stimulating Hormone 0.709 uIU/ML





[address] [place] [address]


Medical Decision Making


Medical Screen Exam Complete:  Yes


Emergency Medical Condition:  Yes


Medical Record Reviewed:  Yes


Interpretation(s)


EKG shows NSR, no ST elevation or depression, and no arrhythmias.  No  

significant T-wave inversions.  No significant ST changes.  No QT prolongation 

or delta wave.








Laboratory Tests








Test 5/15/17





 11:30


 


D-Dimer Quantitative (PE/DVT) 1.09 MG/L FEU





 (0.00-0.50)


 


Urine Bacteria RARE /hpf





 (NONE)


 


Free Triiodothyronine (T3) 2.16 PG/ML





pg/dL (2.18-3.98)








Last 24 hours Impressions








CT Angiography 5/15/17 1236 Signed





Impressions: 





 Service Date/Time:  Monday, May 15, 2017 15:33 - CONCLUSION: Negative for 





 pulmonary emboli.  History of cardiac stent.      Maged Llanos MD  FACR


 


Chest X-Ray 5/15/17 1122 Signed





Impressions: 





 Service Date/Time:  Monday, May 15, 2017 11:56 - CONCLUSION:  No acute 

disease.  





 No significant change has occurred.       Moody Foote MD 








Differential Diagnosis


Chest discomfort, dizziness, near syncopedysrhythmias versus dehydration 

versus pneumonia versus anxiety attack


Narrative Course


EKG did not show dysrhythmias.  Cardiac enzymes and workup was essentially 

unremarkable.  Patient has no focal neurological deficits.  Negative Romberg 

testing.  At this point, she is fairly symptomatic and plan would be to admit 

her as an observation for further evaluation.  Case was discussed with Dr. Aguilar for admission.





Diagnosis





 Primary Impression:  


 Chest pain


 Additional Impression:  


 Syncope





Admitting Information


Admitting Physician Requests:  Admit








Oscar Lerma MD May 15, 2017 11:32

## 2017-05-15 NOTE — RADRPT
EXAM DATE/TIME:  05/15/2017 11:56 

 

[facility] COMPARISON:     

CHEST SINGLE AP, April 18, 2017, 16:01.

 

                     

INDICATIONS :     

Chest pain, palpitations. 

                     

 

MEDICAL HISTORY :     

Cardiovascular disease.          

 

SURGICAL HISTORY :     

Coronary artery stent.   

 

ENCOUNTER:     

Initial                                        

 

ACUITY:     

1 day      

 

PAIN SCORE:     

5/10

 

LOCATION:     

Bilateral chest 

 

FINDINGS:     

 

 

A single view of the chest demonstrates the lungs to be symmetrically aerated without evidence of mas
s, infiltrate or effusion.  The cardiomediastinal contours are unremarkable.  Osseous structures are 
intact.CONCLUSION:     

No acute disease.  No significant change has occurred.  

 

 

 

 Moody Foote MD on May 15, 2017 at 12:32           

Board Certified Radiologist.

 This report was verified electronically.

## 2017-05-16 VITALS
OXYGEN SATURATION: 96 % | SYSTOLIC BLOOD PRESSURE: 119 MMHG | RESPIRATION RATE: 20 BRPM | DIASTOLIC BLOOD PRESSURE: 58 MMHG | HEART RATE: 70 BPM

## 2017-05-16 VITALS
HEART RATE: 66 BPM | RESPIRATION RATE: 20 BRPM | DIASTOLIC BLOOD PRESSURE: 63 MMHG | OXYGEN SATURATION: 93 % | SYSTOLIC BLOOD PRESSURE: 126 MMHG | TEMPERATURE: 98.3 F

## 2017-05-16 VITALS
SYSTOLIC BLOOD PRESSURE: 98 MMHG | TEMPERATURE: 97.6 F | HEART RATE: 74 BPM | RESPIRATION RATE: 16 BRPM | DIASTOLIC BLOOD PRESSURE: 61 MMHG | OXYGEN SATURATION: 95 %

## 2017-05-16 VITALS
OXYGEN SATURATION: 96 % | RESPIRATION RATE: 14 BRPM | DIASTOLIC BLOOD PRESSURE: 60 MMHG | SYSTOLIC BLOOD PRESSURE: 124 MMHG | TEMPERATURE: 98.5 F | HEART RATE: 68 BPM

## 2017-05-16 VITALS
RESPIRATION RATE: 18 BRPM | OXYGEN SATURATION: 95 % | TEMPERATURE: 99.6 F | DIASTOLIC BLOOD PRESSURE: 65 MMHG | HEART RATE: 75 BPM | SYSTOLIC BLOOD PRESSURE: 113 MMHG

## 2017-05-16 VITALS — HEART RATE: 84 BPM

## 2017-05-16 LAB — CK SERPL-CCNC: 72 U/L (ref 26–192)

## 2017-05-16 RX ADMIN — PANTOPRAZOLE SCH MG: 40 TABLET, DELAYED RELEASE ORAL at 08:06

## 2017-05-16 RX ADMIN — METOPROLOL TARTRATE SCH MG: 25 TABLET, FILM COATED ORAL at 08:07

## 2017-05-16 RX ADMIN — LOSARTAN POTASSIUM SCH MG: 50 TABLET, FILM COATED ORAL at 08:06

## 2017-05-16 NOTE — EKG
Date Performed: 05/15/2017       Time Performed: 11:24:16

 

PTAGE:      57 years

 

EKG:      Sinus rhythm 

 

 NONSPECIFIC T-WAVE ABNORMALITY BORDERLINE ECG

 

PREVIOUS TRACING       : 04/18/2017 21.08 Compared to prior tracing no significant change

 

DOCTOR:   Joni Cervantes  Interpretating Date/Time  05/16/2017 13:57:14

## 2017-05-16 NOTE — HHI.PR
Subjective


Remarks


Pt reports that she had some palpitations last night around 7:15PM while she 

was still in the ED but this was not recorded on 


 this telemetry for review. 


No events overnight and none this morning.





Objective


Vitals





 Vital Signs








  Date Time  Temp Pulse Resp B/P Pulse Ox O2 Delivery O2 Flow Rate FiO2


 


5/16/17 07:33 98.5 68 14 124/60 96   


 


5/16/17 04:19  70 20 119/58 96   


 


5/16/17 00:20 98.3 66 20 126/63 93   


 


5/15/17 21:39  71      


 


5/15/17 19:38 100.1 98 20 138/68 97   


 


5/15/17 16:41  90 16 136/81 96   


 


5/15/17 14:32  88 16 131/74 98   


 


5/15/17 12:11     96 Room Air  


 


5/15/17 12:11  92    Room Air  


 


5/15/17 11:15  89 17 144/73 96 Room Air  


 


5/15/17 10:45 98.9 109 20 135/89 96 Room Air  








Result Diagram:  


5/15/17 1130                                                                   

             5/15/17 1130





Other Results





 Laboratory Tests








Test 5/15/17 5/15/17 5/16/17





 11:30 18:58 02:23


 


White Blood Count 6.8 TH/MM3  


 


Red Blood Count 4.32 MIL/MM3  


 


Hemoglobin 12.8 GM/DL  


 


Hematocrit 37.9 %  


 


Mean Corpuscular Volume 87.8 FL  


 


Mean Corpuscular Hemoglobin 29.6 PG  


 


Mean Corpuscular Hemoglobin 33.7 %  





Concent   


 


Red Cell Distribution Width 12.9 %  


 


Platelet Count 311 TH/MM3  


 


Mean Platelet Volume 7.0 FL  


 


Neutrophils (%) (Auto) 67.1 %  


 


Lymphocytes (%) (Auto) 25.8 %  


 


Monocytes (%) (Auto) 6.1 %  


 


Eosinophils (%) (Auto) 0.7 %  


 


Basophils (%) (Auto) 0.3 %  


 


Neutrophils # (Auto) 4.5 TH/MM3  


 


Lymphocytes # (Auto) 1.7 TH/MM3  


 


Monocytes # (Auto) 0.4 TH/MM3  


 


Eosinophils # (Auto) 0.0 TH/MM3  


 


Basophils # (Auto) 0.0 TH/MM3  


 


CBC Comment DIFF FINAL   


 


Differential Comment    


 


Prothrombin Time 10.3 SEC  


 


Prothromb Time International 0.9 RATIO  





Ratio   


 


Activated Partial 24.5 SEC  





Thromboplast Time   


 


D-Dimer Quantitative (PE/DVT) 1.09 MG/L FEU  


 


Urine Color YELLOW   


 


Urine Turbidity CLEAR   


 


Urine pH 7.0   


 


Urine Specific Gravity 1.008   


 


Urine Protein NEG mg/dL  


 


Urine Glucose (UA) NEG mg/dL  


 


Urine Ketones NEG mg/dL  


 


Urine Occult Blood NEG   


 


Urine Nitrite NEG   


 


Urine Bilirubin NEG   


 


Urine Urobilinogen LESS THAN 2.0  





 MG/DL  


 


Urine Leukocyte Esterase NEG   


 


Urine RBC LESS THAN 1  





 /hpf  


 


Urine WBC LESS THAN 1  





 /hpf  


 


Urine Bacteria RARE /hpf  


 


Microscopic Urinalysis Comment CULT NOT  





 INDICATED  


 


Sodium Level 141 MEQ/L  


 


Potassium Level 3.6 MEQ/L  


 


Chloride Level 107 MEQ/L  


 


Carbon Dioxide Level 25.9 MEQ/L  


 


Anion Gap 8 MEQ/L  


 


Blood Urea Nitrogen 9 MG/DL  


 


Creatinine 0.56 MG/DL  


 


Estimat Glomerular Filtration 111 ML/MIN  





Rate   


 


Random Glucose 97 MG/DL  


 


Calcium Level 8.6 MG/DL  


 


Magnesium Level 2.3 MG/DL  


 


Total Bilirubin 0.4 MG/DL  


 


Aspartate Amino Transf 23 U/L  





(AST/SGOT)   


 


Alanine Aminotransferase 30 U/L  





(ALT/SGPT)   


 


Alkaline Phosphatase 59 U/L  


 


Total Protein 6.8 GM/DL  


 


Albumin 3.6 GM/DL  


 


Free Thyroxine 1.23 NG/DL  


 


Free Triiodothyronine (T3) 2.16 PG/ML  





pg/dL   


 


Thyroid Stimulating Hormone 0.709 uIU/ML  





3rd Gen   


 


Total Creatine Kinase  84 U/L 72 U/L


 


Troponin I  LESS THAN 0.02 LESS THAN 0.02





  NG/ML NG/ML








Imaging





Last Impressions








CT Angiography 5/15/17 1236 Signed





Impressions: 





 Service Date/Time:  Monday, May 15, 2017 15:33 - CONCLUSION: Negative for 





 pulmonary emboli.  History of cardiac stent.      Maged Llanos MD  FACR


 


Chest X-Ray 5/15/17 1122 Signed





Impressions: 





 Service Date/Time:  Monday, May 15, 2017 11:56 - CONCLUSION:  No acute 

disease.  





 No significant change has occurred.       Moody Foote MD 








Objective Remarks


General: NAD, AAOx3


Chest: CTA


Cardiac: Regular


Abd: +BS, soft ND/NT


Ext: No edema





A/P


Problem List:  


(1) Palpitations


Status:  Acute


Plan:  


- Pt is 56 yo with CAD and previous LAD SALAZAR in 2014


- Pt has been having intermittent symptoms of dizziness, generalized weakness 

and palpitations with chest pain for several months. 


- She underwent LHC in 12/2016 for evaluation of these symptoms with no 

obstruction noted. 


- She had a Lexiscan 4/17 with no definite ischemia


- Pt presented back to the ED on 5/15/17 with recurrent palpitations and chest 

pain. 





- No events recorded on telemetry from overnight. 


- Her cardiologist, Dr. Thompson has been consulted


- Pt reports that her PCP and an Endocrinologist are looking into her thyroid 

as a possible cause as well. 


- Cont home meds





- DVT prophylaxis with SCDs





(2) Coronary artery disease


Status:  Chronic


(3) Hypertension


Status:  Chronic


(4) Hypothyroidism


Status:  Chronic


Assessment and Plan


Patient examined.


Assessment and plan formulated with Adriane Vu PA-C.


I agree with the above.


palpitation. discussed with dr Thompson.


stop hydralazine. norvasc started. he wanted to get ct a/p


to eval adrenal then d/c if negative and f/u for possible event


monitor.








Adriane Vu May 16, 2017 08:39


Sergio Aguilar MD May 16, 2017 14:40

## 2017-05-16 NOTE — MB
cc:

BAYRON LANDA M.D.

****

 

 

DATE OF CONSULTATION:

05/16/2017

 

REASON FOR CONSULTATION:

Cardiology consultation for evaluation of palpitations.

 

HISTORY OF PRESENT ILLNESS:

Daysi Moraes is a 56-year-old woman, well-known to me she has coronary artery

disease, hypertension, hyperlipidemia and obesity.  She had a stent by Dr. Miles of her left anterior descending November 14, 2014.  This was a 4.0 x 22

mm integrity stent.  Her last cardiac catheterization was performed December 2016 showing no significant stenoses.

 

The last nuclear stress test earlier this year showed no evidence for ischemia.

She comes into the office saying that this morning she got up took her

medications which included hydralazine and then felt her heart pounding, was

dizzy and was lightheaded.  She has she has known hypertension.  She has been

on metoprolol and on losartan.  The losartan been increased to 50 b.i.d.,

Metoprolol as an increase of 75 b.i.d.

 

She had previously had a reaction to her cardia but this is when she was

pregnant.  She preferred to stay on the amlodipine but had feet swelling and

was switched to losartan.  She is previously on thiazide diuretic but this was

stopped due to low potassium.  Amlodipine has not been tried with her taking an

angiotensin receptor blocker.

 

MEDICATIONS:

Her other medications are charted.

 

ALLERGIES

CIPRO

DARVOCET

PROCARDIA

SULFA

ZANAFLEX.

 

SOCIAL HISTORY:

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

 

 

 

 

PAST MEDICAL HISTORY:

1. Includes allergic rhinitis

2. Anxiety

3. Coronary artery disease

4. Cervical radiculopathy

5. Atypical chest pain.

6. esophageal reflux

7. Fibromyalgia

8. hyperlipidemia

9. Hypertension

10. thyroidism

11. inflammatory arthritis

12. lymphadenopathy

13. Obesity

14. Palpitations

15. shortness of breath

16. temporomandibular joint pain

17. Vitamin D deficiency.

 

LABORATORY WORK UP:

The had lab work including urinary metanephrine are showing some degree of

abnormality.

 

REVIEW OF SYSTEMS

The review of systems otherwise negative.

 

PHYSICAL EXAMINATION:

IN GENERAL: Obese pleasant white female in no acute distress.

VITAL SIGNS: Charted.

HEAD, EYES, EARS, NOSE, AND THROAT: Exam unremarkable.

NECK: No JVD, no bruits.

CHEST: The chest is clear to auscultation.

CARDIOVASCULAR SYSTEM: Cardiac exam S1-S2 regular rate and rhythm.  No murmurs,

gallops.

ABDOMEN:  Soft, nontender.  No masses or organomegaly.

EXTREMITIES:  No clubbing, cyanosis or edema.

NEUROLOGICALLY: Alert and oriented and nonfocal.

 

RADIOLOGIC:

Electrocardiogram shows no evidence for ischemia.

 

Cardiac enzymes normal and regular labs unremarkable.

 

 

 

 

 

 

IMPRESSION

Palpitations.

 

PLAN:

Suspect this is most likely a side effect of her Hydralazine we will recommend

discontinuation of Hydralazine. I would recommend discontinuation of

hydralazine and placing with amlodipine 5 mg daily, this can always be adjusted

up or down as needed for blood pressure.  Hopefully the presence of losartan

will prevent any edema, side effect.

 

We will check a noncontrast abdominal CT to rule out pheochromocytoma in view

of the abnormal labs she has had.  I would wait a few days and then get an

outpatient 24 Holter to look for evidence for arrhythmias, I doubt that she is

having any type of acute coronary syndrome to my perspective, she can be

discharged home later today after a CT scan.

 

 

                              _________________________________

                              MD DACIA Velasco/terrence

D:  5/16/2017/9:41 AM

T:  5/16/2017/10:08 AM

Visit #:  [id number]

Job #:  [id number]

## 2017-05-16 NOTE — RADRPT
EXAM DATE/TIME:  05/16/2017 16:07 

 

[facility] COMPARISON:     

No previous studies available for comparison.

 

 

INDICATIONS :     

Abdomen pain.

                      

 

ORAL CONTRAST:      

No oral contrast ingested.

                      

 

RADIATION DOSE:     

10.96 CTDIvol (mGy) 

 

 

MEDICAL HISTORY :     

Hypertension. Cardiovascular disease 

 

SURGICAL HISTORY :      

None. 

 

ENCOUNTER:      

Initial

 

ACUITY:      

1 day

 

PAIN SCALE:      

4/10

 

LOCATION:       

Bilateral  abdomen.

 

TECHNIQUE:     

Volumetric scanning of the abdomen and pelvis was performed.  Using automated exposure control and ad
justment of the mA and/or kV according to patient size, radiation dose was kept as low as reasonably 
achievable to obtain optimal diagnostic quality images. 

 

FINDINGS:     

There is  subsegmental atelectasis in the both bases. Trace bilateral effusions are present. The live
r and spleen are free of focal defects. The gallbladder and pancreas demonstrate no abnormality. The 
adrenal glands are normal. The kidneys demonstrate no evidence of solid renal mass or hydronephrosis.
 No free fluid or abdominal masses are identified. No para-aortic adenopathy is seen.

 

Examination of the pelvis demonstrates no evidence of free fluid or pelvic mass. No abnormally enlarg
ed inguinal or retroperitoneal lymph nodes are present. The bladder is unremarkable.

 

CONCLUSION:     

1. No evidence of acute abdominal or pelvic process. No masses are identified.

 

 

 

 Roberto Perales MD on May 16, 2017 at 17:20           

Board Certified Radiologist.

 This report was verified electronically.

## 2017-05-16 NOTE — HHI.DCPOC
Discharge Care Plan


Diagnosis:  


(1) Palpitations


(2) Hypertension


(3) Hypothyroidism


(4) Coronary artery disease


(5) Chest pain


Goals to Promote Your Health


* To prevent worsening of your condition and complications


* To maintain your health at the optimal level


Directions to Meet Your Goals


*** Take your medications as prescribed


*** Follow your dietary instruction


*** Follow activity as directed








*** Keep your appointments as scheduled


*** Take your immunizations and boosters as scheduled


*** If your symptoms worsen call your PCP, if no PCP go to Urgent Care Center 

or Emergency Room***


*** Smoking is Dangerous to Your Health. Avoid second hand smoke***


***Call the 24-hour hour crisis hotline for domestic abuse at 1-293.753.1239***








Adriane Vu May 16, 2017 14:03

## 2017-06-22 ENCOUNTER — HOSPITAL ENCOUNTER (OUTPATIENT)
Dept: HOSPITAL 17 - NEPE | Age: 57
Setting detail: OBSERVATION
Discharge: HOME | End: 2017-06-22
Attending: INTERNAL MEDICINE | Admitting: INTERNAL MEDICINE
Payer: COMMERCIAL

## 2017-06-22 VITALS — OXYGEN SATURATION: 96 % | RESPIRATION RATE: 17 BRPM

## 2017-06-22 VITALS
OXYGEN SATURATION: 96 % | HEART RATE: 76 BPM | TEMPERATURE: 98.6 F | SYSTOLIC BLOOD PRESSURE: 150 MMHG | DIASTOLIC BLOOD PRESSURE: 85 MMHG | RESPIRATION RATE: 15 BRPM

## 2017-06-22 VITALS
TEMPERATURE: 98.1 F | RESPIRATION RATE: 18 BRPM | HEART RATE: 72 BPM | OXYGEN SATURATION: 96 % | DIASTOLIC BLOOD PRESSURE: 81 MMHG | SYSTOLIC BLOOD PRESSURE: 147 MMHG

## 2017-06-22 VITALS
SYSTOLIC BLOOD PRESSURE: 135 MMHG | RESPIRATION RATE: 18 BRPM | HEART RATE: 75 BPM | DIASTOLIC BLOOD PRESSURE: 81 MMHG | OXYGEN SATURATION: 96 %

## 2017-06-22 VITALS — DIASTOLIC BLOOD PRESSURE: 83 MMHG | HEART RATE: 76 BPM | SYSTOLIC BLOOD PRESSURE: 139 MMHG

## 2017-06-22 VITALS — WEIGHT: 202.83 LBS | BODY MASS INDEX: 32.6 KG/M2 | HEIGHT: 66 IN

## 2017-06-22 VITALS — HEART RATE: 73 BPM

## 2017-06-22 VITALS — OXYGEN SATURATION: 96 %

## 2017-06-22 DIAGNOSIS — R07.89: Primary | ICD-10-CM

## 2017-06-22 DIAGNOSIS — K21.9: ICD-10-CM

## 2017-06-22 DIAGNOSIS — E03.9: ICD-10-CM

## 2017-06-22 DIAGNOSIS — E78.00: ICD-10-CM

## 2017-06-22 DIAGNOSIS — Z88.2: ICD-10-CM

## 2017-06-22 DIAGNOSIS — I25.10: ICD-10-CM

## 2017-06-22 DIAGNOSIS — Z82.49: ICD-10-CM

## 2017-06-22 DIAGNOSIS — Z79.82: ICD-10-CM

## 2017-06-22 DIAGNOSIS — M79.7: ICD-10-CM

## 2017-06-22 DIAGNOSIS — Z95.5: ICD-10-CM

## 2017-06-22 DIAGNOSIS — E78.5: ICD-10-CM

## 2017-06-22 DIAGNOSIS — Z88.5: ICD-10-CM

## 2017-06-22 DIAGNOSIS — Z88.1: ICD-10-CM

## 2017-06-22 DIAGNOSIS — Z88.8: ICD-10-CM

## 2017-06-22 DIAGNOSIS — I10: ICD-10-CM

## 2017-06-22 LAB
ANION GAP SERPL CALC-SCNC: 9 MEQ/L (ref 5–15)
APTT BLD: 25.4 SEC (ref 24.3–30.1)
BASOPHILS # BLD AUTO: 0 TH/MM3 (ref 0–0.2)
BASOPHILS NFR BLD: 0.7 % (ref 0–2)
BUN SERPL-MCNC: 13 MG/DL (ref 7–18)
CHLORIDE SERPL-SCNC: 103 MEQ/L (ref 98–107)
CK MB SERPL-MCNC: 1.8 NG/ML (ref 0.5–3.6)
CK MB SERPL-MCNC: 1.9 NG/ML (ref 0.5–3.6)
CK MB SERPL-MCNC: 2.5 NG/ML (ref 0.5–3.6)
CK SERPL-CCNC: 142 U/L (ref 26–192)
CK SERPL-CCNC: 151 U/L (ref 26–192)
CK SERPL-CCNC: 187 U/L (ref 26–192)
EOSINOPHIL # BLD: 0.1 TH/MM3 (ref 0–0.4)
EOSINOPHIL NFR BLD: 1.6 % (ref 0–4)
ERYTHROCYTE [DISTWIDTH] IN BLOOD BY AUTOMATED COUNT: 14 % (ref 11.6–17.2)
GFR SERPLBLD BASED ON 1.73 SQ M-ARVRAT: 111 ML/MIN (ref 89–?)
HCO3 BLD-SCNC: 26.7 MEQ/L (ref 21–32)
HCT VFR BLD CALC: 39.9 % (ref 35–46)
HEMO FLAGS: (no result)
INR PPP: 0.9 RATIO
LYMPHOCYTES # BLD AUTO: 2.5 TH/MM3 (ref 1–4.8)
LYMPHOCYTES NFR BLD AUTO: 36.3 % (ref 9–44)
MAGNESIUM SERPL-MCNC: 2.4 MG/DL (ref 1.5–2.5)
MCH RBC QN AUTO: 30.8 PG (ref 27–34)
MCHC RBC AUTO-ENTMCNC: 34.5 % (ref 32–36)
MCV RBC AUTO: 89.1 FL (ref 80–100)
MONOCYTES NFR BLD: 6.7 % (ref 0–8)
NEUTROPHILS # BLD AUTO: 3.8 TH/MM3 (ref 1.8–7.7)
NEUTROPHILS NFR BLD AUTO: 54.7 % (ref 16–70)
PLATELET # BLD: 303 TH/MM3 (ref 150–450)
POTASSIUM SERPL-SCNC: 3.4 MEQ/L (ref 3.5–5.1)
PROTHROMBIN TIME: 9.7 SEC (ref 9.8–11.6)
RBC # BLD AUTO: 4.48 MIL/MM3 (ref 4–5.3)
SODIUM SERPL-SCNC: 139 MEQ/L (ref 136–145)
WBC # BLD AUTO: 6.9 TH/MM3 (ref 4–11)

## 2017-06-22 PROCEDURE — 82552 ASSAY OF CPK IN BLOOD: CPT

## 2017-06-22 PROCEDURE — 85610 PROTHROMBIN TIME: CPT

## 2017-06-22 PROCEDURE — 93005 ELECTROCARDIOGRAM TRACING: CPT

## 2017-06-22 PROCEDURE — 99285 EMERGENCY DEPT VISIT HI MDM: CPT

## 2017-06-22 PROCEDURE — 83735 ASSAY OF MAGNESIUM: CPT

## 2017-06-22 PROCEDURE — 84484 ASSAY OF TROPONIN QUANT: CPT

## 2017-06-22 PROCEDURE — 85025 COMPLETE CBC W/AUTO DIFF WBC: CPT

## 2017-06-22 PROCEDURE — 82550 ASSAY OF CK (CPK): CPT

## 2017-06-22 PROCEDURE — G0378 HOSPITAL OBSERVATION PER HR: HCPCS

## 2017-06-22 PROCEDURE — 71010: CPT

## 2017-06-22 PROCEDURE — 85730 THROMBOPLASTIN TIME PARTIAL: CPT

## 2017-06-22 PROCEDURE — 80048 BASIC METABOLIC PNL TOTAL CA: CPT

## 2017-06-22 NOTE — EKG
Date Performed: 06/22/2017       Time Performed: 10:20:26

 

PTAGE:      57 years

 

EKG:      Sinus rhythm 

 

 LOW QRS VOLTAGE IN PRECORDIAL LEADS MINIMAL VOLTAGE CRITERIA FOR LVH, CONSIDER NORMAL VARIANT BORDER
LINE ECG Compared to prior tracing no significant change 

 

 PREVIOUS TRACING            : 05/06/2017 23.26

 

DOCTOR:   Sandy Dan  Interpretating Date/Time  06/22/2017 13:49:28

## 2017-06-22 NOTE — HHI.HP
[facility]


Primary Care Physician


Pranay Henrandez M.D.


Chief Complaint


Chest pain and elevated blood pressure


History of Present Illness


This is a 57-year-old female that presents to ED via private vehicle with her 

daughter with history of CAD and hypertension with a complaint of her blood 

pressure elevated for the last week and a half and having a constant left and 

right-sided chest discomfort since yesterday.  The discomfort in her chest as 

described as a sharp and tight sensation.  She also has had intermittently a 

sensation of her heart fluttering.  No shortness breath, nausea, or 

diaphoresis.  She states her blood pressures have been running high also.  As 

high as 170s.  She saw her cardiologist Dr. Henrry Thompson last Friday and also 

reviewed a Holter monitor that was performed about 2 weeks prior.  She 

apparently had SVT and atrial fibrillation on Holter monitor.  She does not 

know anymore the specifics of it.  Also last Friday her blood pressure was 

discussed.  They contemplating increasing the amlodipine to 5 mg at night but 

decided to wait and see how her blood pressure responds.  She also is being 

evaluated by endocrinologist to determine if her blood pressure issues might be 

related to an endocrine abnormality.  Patient was in the chest pain center in 

April of this year and had a nonischemic Lexiscan.  She has history of a stent 

to the LAD November 14, 2014.  She had a cardiac catheterization December 5, 2016 that revealed 10% in-stent restenosis.  Patient denies recent illnesses.  

Denies fevers or chills.





Review of Systems


General: Patient denies fevers, chills recent, and recent travel


HEENT: Patient denies headache, sore throat, difficulty swallowing.  


Cardiovascular: Has the chest discomfort as mentioned above.  Had sensation of 

her heart fluttering.  No diaphoresis.  No syncope.  


Respiratory: Denies shortness of breath or inspirational chest discomfort.  

Denies coughing wheezing or hemoptysis.  


GI: Patient denies nausea, vomiting, diarrhea, abdominal pain, bloody stools.


Musculoskeletal: Patient denies joint pain or edema.  Denies calf pain or edema.


Neurovascular: Patient denies numbness, tingling, weakness in extremities.  

Denies headache.


Endocrine: Denies polyuria and polydipsia.


Hematologic: Denies easy bruising.


Skin: Denies rash or itching.





Past Family Social History


Allergies:  


Coded Allergies:  


     Cipro (Unverified  Allergy, Severe, 6/22/17)


     Darvocet-N 100 (Unverified  Allergy, Severe, 6/22/17)


     Procardia (Unverified  Allergy, Severe, 6/22/17)


     Sulfa (Verified  Allergy, Intermediate, HIVES, 6/22/17)


     Zanaflex (Verified  Allergy, Intermediate, VOMITING, 6/22/17)


Past Medical History


CAD.  Hypertension.  Hypothyroidism.  Questionable hyperlipidemia.  She was on 

a statin after getting a stent in 2014 but states she's been off the statins 

for least 1 year.  Denies diabetes.


Past Surgical History


Cardiac catheterizations.  One with an intervention in the most recently 

without intervention.


Reported Medications





Reported Meds & Active Scripts


Active


Reported


Cymbalta DR (Duloxetine HCl) 60 Mg Capdr 60 Mg PO BID


Amlodipine (Amlodipine Besylate) 2.5 Mg Tab 2.5 Mg PO DAILY


Gabapentin 600 Mg Tab 600 Mg PO DAILY


Losartan (Losartan Potassium) 50 Mg Tab 50 Mg PO BID


Metoprolol Tartrate 25 Mg Tab 75 Mg PO BID


Levothyroxine (Levothyroxine Sodium) 75 Mcg Tab 75 Mcg PO DAILY


Lansoprazole 30 Mg Capdr 30 Mg PO BID


Flonase Nasal Spray (Fluticasone Nasal Spray) 50 Mcg/Act Spray 2 Spray EACH 

NARE DAILY PRN


Allegra Allergy (Fexofenadine HCl) 180 Mg Tab 180 Mg PO DAILY PRN


Aviane (Levonorgestrel-Ethinyl Estradiol) 0.1-20 Mg-Mcg Tab 1 Tab PO DAILY


Aspirin Adult Low Strength (Aspirin) 81 Mg Tabdr 81 Mg PO HS


Active Ordered Medications





 Current Medications








 Medications


  (Trade)  Dose


 Ordered  Sig/Letha


 Route  Start Time


 Stop Time Status Last Admin


 


  (NS Flush)  2 ml  UNSCH  PRN


 IVF  6/22/17 10:45


     


 








Social History


Patient is a lifetime nonsmoker.





Physical Exam


Vital Signs





 Vital Signs








  Date Time  Temp Pulse Resp B/P Pulse Ox O2 Delivery O2 Flow Rate FiO2


 


6/22/17 13:00  75 18 135/81 96 Room Air  


 


6/22/17 11:14  76  139/78    





    138/83    


 


6/22/17 10:15     96 Room Air  


 


6/22/17 10:15   17  96 Room Air  


 


6/22/17 09:52 98.6 76 15 150/85 96   








Physical Exam


GENERAL: This is a well-nourished, well-developed patient, in no apparent 

distress.  Patient speaks in clear complete sentences.  Patient is pleasant.


HEENT: Head is atraumatic and normocephalic.  Neck is supple without 

lymphadenopathy and trachea is midline.  No JVD or carotid bruits.


CARDIOVASCULAR: Regular rate and rhythm without murmurs, gallops, or rubs. 


RESPIRATORY: Clear to auscultation. Breath sounds equal bilaterally. No wheezes

, rales, or rhonchi.  Chest wall is tender worsening the discomfort she has had 

since yesterday.  No use of accessory muscles.


GASTROINTESTINAL: Abdomen is nontender, nondistended.  Abdomen soft.  No 

obvious pulsatile mass or bruit.  No CVA tenderness.  Strong femoral pulses 

bilaterally.  Normal bowel sounds in all quadrants.


MUSCULOSKELETAL: Patient is moving upper and lower extremities freely.  No calf 

tenderness or edema, no Homans sign.  Strong pulses in upper and lower 

extremities.


NEUROLOGICAL: Patient is alert and oriented.  Cranial nerves 2-12 are grossly 

intact.  No focal deficits and speech is clear.


SKIN: No rash and turgor is normal.


Laboratory





Laboratory Tests








Test 6/22/17





 10:50


 


White Blood Count 6.9 


 


Red Blood Count 4.48 


 


Hemoglobin 13.8 


 


Hematocrit 39.9 


 


Mean Corpuscular Volume 89.1 


 


Mean Corpuscular Hemoglobin 30.8 


 


Mean Corpuscular Hemoglobin 34.5 





Concent 


 


Red Cell Distribution Width 14.0 


 


Platelet Count 303 


 


Mean Platelet Volume 7.1 


 


Neutrophils (%) (Auto) 54.7 


 


Lymphocytes (%) (Auto) 36.3 


 


Monocytes (%) (Auto) 6.7 


 


Eosinophils (%) (Auto) 1.6 


 


Basophils (%) (Auto) 0.7 


 


Neutrophils # (Auto) 3.8 


 


Lymphocytes # (Auto) 2.5 


 


Monocytes # (Auto) 0.5 


 


Eosinophils # (Auto) 0.1 


 


Basophils # (Auto) 0.0 


 


CBC Comment DIFF FINAL 


 


Differential Comment  


 


Prothrombin Time 9.7 


 


Prothromb Time International 0.9 





Ratio 


 


Activated Partial 25.4 





Thromboplast Time 


 


Sodium Level 139 


 


Potassium Level 3.4 


 


Chloride Level 103 


 


Carbon Dioxide Level 26.7 


 


Anion Gap 9 


 


Blood Urea Nitrogen 13 


 


Creatinine 0.56 


 


Estimat Glomerular Filtration 111 





Rate 


 


Random Glucose 93 


 


Calcium Level 9.0 


 


Magnesium Level 2.4 


 


Total Creatine Kinase 187 


 


Creatine Kinase MB 2.5 


 


Troponin I LESS THAN 0.02 








Result Diagram:  


6/22/17 1050                                                                   

             6/22/17 1050





Imaging





Last 24 hours Impressions








Chest X-Ray 6/22/17 1041 Signed





Impressions: 





 Service Date/Time:  Thursday, June 22, 2017 10:41 - CONCLUSION:  1. Dilation 

of 





 aortic root similar previous exam. 2. The lungs are clear.     Ron Llanos MD 








Course


Initial EKG is sinus rhythm rate of 74 without significant ST segment 

depressions or elevations.





Assessment and Plan


Assessment and Plan


* Atypical chest pain: Patient will continue to have serial cardiac enzymes and 

EKGs for ruling out purposes.  She had a recent nonischemic Lexiscan April 2017.  She will be seen by Dr. Valdez cardiology in the chest pain center.

  Further plan to be terminated after her evaluation as well as after speaking 

with the patient's cardiologist Dr. Henrry Thompson.


* Hypertension: We'll discuss further with Dr. Valdez and with Dr. Henrry Thompson.  Apparently the suggestion was to increase amlodipine but patient has 

raised her concerns about edema in her legs with an increased amlodipine dose.


* CAD: Patient history of a stent.  She has a nonischemic Lexiscan April this 

year.  She will continue to follow-up with her cardiologist.  She needs discuss 

with her cardiologist in therapy.  Currently no statin and her medication list 

and patient is not aware of contraindication.


* Hypothyroidism: Continue current medication.


Patient is stable at this time.  She is agreeable to this plan.








Willy Leone Jun 22, 2017 14:02

## 2017-06-22 NOTE — RADRPT
EXAM DATE/TIME:  06/22/2017 10:41 

 

[facility] COMPARISON:     

CT ABDOMEN & PELVIS W CONTRAST, May 16, 2017, 16:07.  CHEST SINGLE AP, May 15, 2017, 11:56.

 

                     

INDICATIONS :     

Chest pain for 1 week. 

                     

 

MEDICAL HISTORY :     

Hypertension.       Coronary artery disease. Irregular heartbeat.    

 

SURGICAL HISTORY :        

Coronary stent. 

 

ENCOUNTER:     

Initial                                        

 

ACUITY:     

1 week      

 

PAIN SCORE:     

7/10

 

LOCATION:     

Bilateral chest 

 

FINDINGS:     

The heart is normal in size. There is dilation of the aortic root which is similar to previous dated 
5/15/17. The mediastinal contours are otherwise unremarkable. The visualized pulmonary parenchyma is 
clear. The visualized bony structures are grossly intact.

 

CONCLUSION:     

1. Dilation of aortic root similar previous exam.

2. The lungs are clear.

 

 

 

 Ron Llanos MD on June 22, 2017 at 11:15           

Board Certified Radiologist.

 This report was verified electronically.

## 2017-06-22 NOTE — HHI.DCPOC
Discharge Care Plan


Diagnosis:  


(1) Chest pain, atypical


(2) Hypertension


(3) Hypokalemia


(4) Coronary artery disease


(5) H/O heart artery stent


(6) Palpitations


(7) Hypothyroidism


Goals to Promote Your Health


DISCUSS TAKING CHOLESTEROL MEDICATIONS WITH DR LANDA.


HAVE ELECTROLYTES RECHECKED AT A LAB IN ONE WEEK AND DR LANDA WILL FOLLOW 

THOSE RESULTS.





* To prevent worsening of your condition and complications


* To maintain your health at the optimal level


Directions to Meet Your Goals


*** Take your medications as prescribed


*** Follow your dietary instruction


*** Follow activity as directed








*** Keep your appointments as scheduled


*** Take your immunizations and boosters as scheduled


*** If your symptoms worsen call your PCP, if no PCP go to Urgent Care Center 

or Emergency Room***


*** Smoking is Dangerous to Your Health. Avoid second hand smoke***


***Call the 24-hour hour crisis hotline for domestic abuse at 1-189.282.2108***








Willy Leone Jun 22, 2017 17:00

## 2017-06-23 NOTE — EKG
Date Performed: 2017       Time Performed: 13:48:44

 

PTAGE:      57 years

 

EKG:      Sinus rhythm 

 

 MINIMAL VOLTAGE CRITERIA FOR LVH, CONSIDER NORMAL VARIANT NONSPECIFIC T-WAVE ABNORMALITY BORDERLINE 
ECG Since 

 

 PREVIOUS TRACING            , no significant change noted PREVIOUS TRACIN2017 10.20

 

DOCTOR:   Josselin Valdez  Interpretating Date/Time  2017 15:34:19

## 2017-06-23 NOTE — EKG
Date Performed: 2017       Time Performed: 17:06:40

 

PTAGE:      57 years

 

EKG:      Sinus rhythm 

 

 NONSPECIFIC T-WAVE ABNORMALITY BORDERLINE ECG Since 

 

 PREVIOUS TRACING            , no significant change noted PREVIOUS TRACIN2017 13.48

 

DOCTOR:   Josselin Valdez  Interpretating Date/Time  2017 15:34:03

## 2018-05-17 ENCOUNTER — HOSPITAL ENCOUNTER (OUTPATIENT)
Dept: HOSPITAL 17 - NEPE | Age: 58
Setting detail: OBSERVATION
LOS: 1 days | Discharge: HOME | End: 2018-05-18
Attending: INTERNAL MEDICINE | Admitting: INTERNAL MEDICINE
Payer: COMMERCIAL

## 2018-05-17 VITALS
RESPIRATION RATE: 20 BRPM | SYSTOLIC BLOOD PRESSURE: 147 MMHG | DIASTOLIC BLOOD PRESSURE: 80 MMHG | OXYGEN SATURATION: 93 % | HEART RATE: 100 BPM

## 2018-05-17 VITALS
OXYGEN SATURATION: 98 % | TEMPERATURE: 98.2 F | DIASTOLIC BLOOD PRESSURE: 84 MMHG | SYSTOLIC BLOOD PRESSURE: 140 MMHG | HEART RATE: 76 BPM | RESPIRATION RATE: 18 BRPM

## 2018-05-17 VITALS
HEART RATE: 99 BPM | RESPIRATION RATE: 18 BRPM | SYSTOLIC BLOOD PRESSURE: 148 MMHG | OXYGEN SATURATION: 94 % | DIASTOLIC BLOOD PRESSURE: 73 MMHG

## 2018-05-17 VITALS
RESPIRATION RATE: 20 BRPM | HEART RATE: 107 BPM | DIASTOLIC BLOOD PRESSURE: 67 MMHG | SYSTOLIC BLOOD PRESSURE: 146 MMHG | OXYGEN SATURATION: 95 %

## 2018-05-17 VITALS
RESPIRATION RATE: 16 BRPM | TEMPERATURE: 98.1 F | DIASTOLIC BLOOD PRESSURE: 79 MMHG | HEART RATE: 70 BPM | SYSTOLIC BLOOD PRESSURE: 140 MMHG | OXYGEN SATURATION: 96 %

## 2018-05-17 VITALS — HEIGHT: 70 IN | WEIGHT: 205.03 LBS | BODY MASS INDEX: 29.35 KG/M2

## 2018-05-17 VITALS — OXYGEN SATURATION: 98 %

## 2018-05-17 VITALS
HEART RATE: 98 BPM | RESPIRATION RATE: 18 BRPM | OXYGEN SATURATION: 95 % | SYSTOLIC BLOOD PRESSURE: 152 MMHG | DIASTOLIC BLOOD PRESSURE: 88 MMHG | TEMPERATURE: 98.3 F

## 2018-05-17 VITALS — OXYGEN SATURATION: 95 %

## 2018-05-17 DIAGNOSIS — R94.31: ICD-10-CM

## 2018-05-17 DIAGNOSIS — R51: ICD-10-CM

## 2018-05-17 DIAGNOSIS — I10: ICD-10-CM

## 2018-05-17 DIAGNOSIS — M79.7: ICD-10-CM

## 2018-05-17 DIAGNOSIS — I25.10: ICD-10-CM

## 2018-05-17 DIAGNOSIS — R11.0: ICD-10-CM

## 2018-05-17 DIAGNOSIS — Z79.01: ICD-10-CM

## 2018-05-17 DIAGNOSIS — R07.89: Primary | ICD-10-CM

## 2018-05-17 DIAGNOSIS — E03.9: ICD-10-CM

## 2018-05-17 DIAGNOSIS — Z95.5: ICD-10-CM

## 2018-05-17 DIAGNOSIS — R06.02: ICD-10-CM

## 2018-05-17 DIAGNOSIS — I71.2: ICD-10-CM

## 2018-05-17 LAB
BASOPHILS # BLD AUTO: 0.1 TH/MM3 (ref 0–0.2)
BASOPHILS NFR BLD: 1.4 % (ref 0–2)
BUN SERPL-MCNC: 8 MG/DL (ref 7–18)
CALCIUM SERPL-MCNC: 8.3 MG/DL (ref 8.5–10.1)
CHLORIDE SERPL-SCNC: 107 MEQ/L (ref 98–107)
CREAT SERPL-MCNC: 0.68 MG/DL (ref 0.5–1)
EOSINOPHIL # BLD: 0.1 TH/MM3 (ref 0–0.4)
EOSINOPHIL NFR BLD: 1.3 % (ref 0–4)
ERYTHROCYTE [DISTWIDTH] IN BLOOD BY AUTOMATED COUNT: 13.5 % (ref 11.6–17.2)
GFR SERPLBLD BASED ON 1.73 SQ M-ARVRAT: 88 ML/MIN (ref 89–?)
GLUCOSE SERPL-MCNC: 152 MG/DL (ref 74–106)
HCO3 BLD-SCNC: 25 MEQ/L (ref 21–32)
HCT VFR BLD CALC: 39.7 % (ref 35–46)
HGB BLD-MCNC: 13.7 GM/DL (ref 11.6–15.3)
INR PPP: 1 RATIO
LYMPHOCYTES # BLD AUTO: 2.7 TH/MM3 (ref 1–4.8)
LYMPHOCYTES NFR BLD AUTO: 45.5 % (ref 9–44)
MAGNESIUM SERPL-MCNC: 2.3 MG/DL (ref 1.5–2.5)
MCH RBC QN AUTO: 30.6 PG (ref 27–34)
MCHC RBC AUTO-ENTMCNC: 34.5 % (ref 32–36)
MCV RBC AUTO: 88.5 FL (ref 80–100)
MONOCYTE #: 0.5 TH/MM3 (ref 0–0.9)
MONOCYTES NFR BLD: 7.8 % (ref 0–8)
NEUTROPHILS # BLD AUTO: 2.6 TH/MM3 (ref 1.8–7.7)
NEUTROPHILS NFR BLD AUTO: 44 % (ref 16–70)
PLATELET # BLD: 293 TH/MM3 (ref 150–450)
PMV BLD AUTO: 6.9 FL (ref 7–11)
PROTHROMBIN TIME: 10 SEC (ref 9.8–11.6)
RBC # BLD AUTO: 4.48 MIL/MM3 (ref 4–5.3)
SODIUM SERPL-SCNC: 142 MEQ/L (ref 136–145)
TROPONIN I SERPL-MCNC: (no result) NG/ML (ref 0.02–0.05)
WBC # BLD AUTO: 6 TH/MM3 (ref 4–11)

## 2018-05-17 PROCEDURE — 83735 ASSAY OF MAGNESIUM: CPT

## 2018-05-17 PROCEDURE — 82550 ASSAY OF CK (CPK): CPT

## 2018-05-17 PROCEDURE — 80048 BASIC METABOLIC PNL TOTAL CA: CPT

## 2018-05-17 PROCEDURE — 99285 EMERGENCY DEPT VISIT HI MDM: CPT

## 2018-05-17 PROCEDURE — 93017 CV STRESS TEST TRACING ONLY: CPT

## 2018-05-17 PROCEDURE — G0378 HOSPITAL OBSERVATION PER HR: HCPCS

## 2018-05-17 PROCEDURE — 84443 ASSAY THYROID STIM HORMONE: CPT

## 2018-05-17 PROCEDURE — 93005 ELECTROCARDIOGRAM TRACING: CPT

## 2018-05-17 PROCEDURE — A9502 TC99M TETROFOSMIN: HCPCS

## 2018-05-17 PROCEDURE — 78452 HT MUSCLE IMAGE SPECT MULT: CPT

## 2018-05-17 PROCEDURE — 74174 CTA ABD&PLVS W/CONTRAST: CPT

## 2018-05-17 PROCEDURE — 82552 ASSAY OF CPK IN BLOOD: CPT

## 2018-05-17 PROCEDURE — 71275 CT ANGIOGRAPHY CHEST: CPT

## 2018-05-17 PROCEDURE — 85730 THROMBOPLASTIN TIME PARTIAL: CPT

## 2018-05-17 PROCEDURE — 84484 ASSAY OF TROPONIN QUANT: CPT

## 2018-05-17 PROCEDURE — 71045 X-RAY EXAM CHEST 1 VIEW: CPT

## 2018-05-17 PROCEDURE — 96374 THER/PROPH/DIAG INJ IV PUSH: CPT

## 2018-05-17 PROCEDURE — 85025 COMPLETE CBC W/AUTO DIFF WBC: CPT

## 2018-05-17 PROCEDURE — 85610 PROTHROMBIN TIME: CPT

## 2018-05-17 RX ADMIN — Medication SCH ML: at 21:00

## 2018-05-17 NOTE — RADRPT
EXAM DATE/TIME:  2018 18:32 

 

[facility] COMPARISON:     

No previous studies available for comparison.

 

 

INDICATIONS :     

Chest palpitations with known thoracic aortic aneurysm.

                           

 

IV CONTRAST:     

100 cc Omnipaque 350 (iohexol) IV 

 

 

RADIATION DOSE:     

17.11 CTDIvol (mGy) 

 

 

MEDICAL HISTORY :     

Cardiovascular disease. Hypertension. Thoracic aneurysm. 

 

SURGICAL HISTORY :      

 section. 

 

ENCOUNTER:      

Initial

 

ACUITY:      

1 day

 

PAIN SCALE:      

5/10

 

LOCATION:        

chest 

 

TECHNIQUE:     

Volumetric scanning was performed using a multi-row detector CT scanner.  The data was post processed
 with a variety of visualization algorithms including full volume maximum intensity projection, multi
-planar sliding thin slab reformation, curved planar reformation, and surface rendering techniques.  
Using automated exposure control and adjustment of the mA and/or kV according to patient size, radiat
ion dose was kept as low as reasonably achievable to obtain optimal diagnostic quality images.  DICOM
 format image data is available electronically for review and comparison.  

 

FINDINGS:     

 

LUNGS:     

There is no consolidation or pneumothorax.  No concerning pulmonary nodule is visualized.  No pleural
 fluid is present.

 

MEDIASTINUM:     

No abnormally enlarged lymph nodes by CT criteria. No axillary or hilar abnormalities are identified.
 

 

ABDOMEN:     

The liver and spleen are free of focal defects. There some lobulation of the spleen without focal mas
s. There is low-density seen throughout the liver. The gallbladder and pancreas demonstrate no abnorm
ality. The adrenal glands are normal. The kidneys demonstrate no evidence of solid renal mass or hydr
onephrosis. There is a small 3 mm nonobstructing left renal stone. No free fluid or abdominal masses 
are identified. No para-aortic adenopathy is seen. Colonic diverticula are present. There is degenera
tive change in the lumbar spine.

 

PELVIS:     

No evidence of free fluid or pelvic mass. No abnormally enlarged inguinal or retroperitoneal lymph no
ganesh are present. The bladder is unremarkable.

 

THORACIC AORTA:       

The thoracic aortic root is normal with normal branching of the great vessels.  There is aneurysmal d
ilatation of the ascending aorta measuring 4.5 cm. There is no evidence of dissection. There is incre
ased density at the LAD related to either calcification or stent.

 

ABDOMINAL AORTA:     

The aorta is normal in caliber without aneurysm or dissection.  The renal arteries are patent bilater
ally.  The proximal celiac and superior mesenteric arteries are patent and normal in diameter.   

 

PELVIC VESSELS:     

The internal iliac and external iliac vessels are patent without aneurysm or stenosis.

 

CONCLUSION:     

1. Aneurysmal dilatation of the ascending thoracic aorta measuring 4.5 cm.

2. No dissection or abdominal aneurysm is seen.

3. Hepatic steatosis.

4. Small 3 mm nonobstructing left renal stone.

5. Colonic diverticula.

 

 

 

 Bonifacio Basilio MD on May 17, 2018 at 19:22           

Board Certified Radiologist.

 This report was verified electronically.

## 2018-05-17 NOTE — PD
HPI


Chief Complaint:  Chest Pain


Time Seen by Provider:  16:20


Travel History


International Travel<30 days:  No


Contact w/Intl Traveler<30days:  No


Traveled to known affect area:  No





History of Present Illness


HPI


58-year-old female with a history of coronary disease, thoracic aortic aneurysm

, presents today with complaints of chest pain.  Patient reports the pain as 

sharp.  She also reports it is between her shoulder blades.  She reports 

associated nausea and shortness of breath.  There is no report of dizziness.  

There is no reported lightheadedness.  Patient states that she has had this 

pain intermittent for quite some time.  She was worried that this may be her 

aortic aneurysm because she read on Web MD that these could be some of the 

symptoms.  The patient reports that she has a new insurance and was being seen 

by Dr. Thompson and Dr. Hernandez.  She states that when she left [facility]

, she was told that she could no longer see them.  She now understands that 

they are on her new healthcare plan.  She has not seen them in quite some time.





PFSH


Past Medical History


Hx Anticoagulant Therapy:  Yes (asa 81)


Blood Disorders:  No


Heart Rhythm Problems:  Yes


Cancer:  No


Cardiac Catheterization:  Yes (stent)


Cardiovascular Problems:  Yes


High Cholesterol:  No


Chest Pain:  Yes


Congestive Heart Failure:  No


Coronary Artery Disease:  Yes


Diabetes:  No


Diminished Hearing:  No


Endocrine:  No


Fibromyalgia:  Yes


Gastrointestinal Disorders:  Yes


GERD:  Yes


Genitourinary:  No


Headaches:  Yes


Heparin Induced Thrombocytopen:  No


Hypertension:  Yes


Immune Disorder:  No


Musculoskeletal:  No


Neurologic:  No


Psychiatric:  No


Reproductive:  No


Respiratory:  No


Thyroid Disease:  Yes (hypothyroid)


Pregnant?:  Unknown


LMP:  last period a few months ago.





Past Surgical History


Abdominal Surgery:  Yes (99' UMBILICAL HERNIA REPAIR)


Cardiac Surgery:  Yes (1 STENT)


 Section:  Yes (X 1)


Coronary Artery Bypass Graft:  No


Coronary Stent:  Yes


Gynecologic Surgery:  Yes (96' )


Oral Surgery:  Yes (WISDOM TEETH AGE 17)


Pacemaker:  No


Other Surgery:  Yes





Family History


Family Myocardial Infarction:  Yes





Social History


Alcohol Use:  No


Tobacco Use:  No


Substance Use:  No





Allergies-Medications


(Allergen,Severity, Reaction):  


Coded Allergies:  


     acetaminophen (Unverified  Allergy, Severe, 18)


     ciprofloxacin (Unverified  Allergy, Severe, 18)


     nifedipine (Unverified  Allergy, Severe, 18)


     propoxyphene (Unverified  Allergy, Severe, 18)


     Sulfa (Sulfonamide Antibiotics) (Unverified  Allergy, Intermediate, HIVES, 

18)


     tizanidine (Unverified  Allergy, Intermediate, VOMITING, 18)


Reported Meds & Prescriptions





Reported Meds & Active Scripts


Active


Reported


Flexeril (Cyclobenzaprine HCl) 10 Mg Tab 10 Mg PO TID


Gabapentin 300 Mg Cap 300 Mg PO TID


Amlodipine (Amlodipine Besylate) 5 Mg Tab 5 Mg PO DAILY


Cymbalta DR (Duloxetine HCl) 60 Mg Capdr 60 Mg PO BID


Losartan (Losartan Potassium) 50 Mg Tab 50 Mg PO BID


Metoprolol Tartrate 25 Mg Tab 75 Mg PO BID


Levothyroxine (Levothyroxine Sodium) 75 Mcg Tab 75 Mcg PO DAILY


Lansoprazole 30 Mg Capdr 30 Mg PO BID


Flonase Nasal Spray (Fluticasone Nasal Spray) 50 Mcg/Act Spray 2 Spray EACH 

NARE DAILY PRN


Allegra Allergy (Fexofenadine HCl) 180 Mg Tab 180 Mg PO DAILY PRN


Aviane (Levonorgestrel-Ethinyl Estradiol) 0.1-20 Mg-Mcg Tab 1 Tab PO DAILY


Aspirin Adult Low Strength (Aspirin) 81 Mg Tabdr 81 Mg PO HS








Review of Systems


Except as stated in HPI:  all other systems reviewed are Neg


General / Constitutional:  No: Fever, Chills


HENT:  No: Headaches, Neck Pain


Cardiovascular:  Positive: Chest Pain or Discomfort, Palpitations, No: 

Irregular Rhythm


Respiratory:  Positive: Shortness of Breath, No: Cough


Gastrointestinal:  No: Nausea, Vomiting, Abdominal Pain


Musculoskeletal:  Positive: Pain (Between shoulder blades), No: Weakness


Neurologic:  No: Weakness, Dizziness, Headache





Physical Exam


Narrative


GENERAL: Well-developed well-nourished female in no acute respiratory distress.


SKIN: Focused skin assessment warm/dry.


HEAD: Atraumatic. Normocephalic. 


EYES: Pupils equal and round. No scleral icterus. No injection or drainage. 


ENT: No nasal bleeding or discharge.  Mucous membranes pink and moist.


NECK: Trachea midline.  Supple.


CARDIOVASCULAR: Regular rate and rhythm.  No murmur appreciated.


RESPIRATORY: No accessory muscle use. Clear to auscultation. Breath sounds 

equal bilaterally. 


GASTROINTESTINAL: Abdomen soft, non-tender, nondistended. Hepatic and splenic 

margins not palpable. 


MUSCULOSKELETAL: No obvious deformities. No clubbing.  No cyanosis.  No edema. 


NEUROLOGICAL: Awake and alert. No obvious cranial nerve deficits.  Motor 

grossly within normal limits. Normal speech.


PSYCHIATRIC: Anxious appearing.





Data


Data


Last Documented VS





Vital Signs








  Date Time  Temp Pulse Resp B/P (MAP) Pulse Ox O2 Delivery O2 Flow Rate FiO2


 


18 18:25  107 20 146/67 (93) 95 Room Air 2.00 


 


18 16:19 98.3       








Orders





 Orders


Electrocardiogram (18 16:21)


Basic Metabolic Panel (Bmp) (18 16:21)


Ckmb (Isoenzyme) Profile (18 16:)


Complete Blood Count With Diff (18 16:)


Magnesium (Mg) (18 16:21)


Prothrombin Time / Inr (Pt) (18 16:)


Act Partial Throm Time (Ptt) (18:)


Troponin I (18 16:)


Chest, Single Ap (18 16:21)


Ecg Monitoring (18 16:21)


Bilateral Bp Monitoring (18 16:)


Iv Access Insert/Monitor (18 16:)


Oximetry (18 16:21)


Oxygen Administration (18 16:21)


Sodium Chloride 0.9% Flush (Ns Flush) (18 16:30)


Morphine Inj (Morphine Inj) (18 17:00)


Ondansetron  Odt (Zofran  Odt) (18 17:00)


Cta Thor Abd Aorta W Iv C W3d (18 )


CKMB (18 16:30)


CKMB% (18 16:30)


Iohexol 350 Inj (Omnipaque 350 Inj) (18 16:17)





Labs





Laboratory Tests








Test


  18


16:30


 


White Blood Count 6.0 TH/MM3 


 


Red Blood Count 4.48 MIL/MM3 


 


Hemoglobin 13.7 GM/DL 


 


Hematocrit 39.7 % 


 


Mean Corpuscular Volume 88.5 FL 


 


Mean Corpuscular Hemoglobin 30.6 PG 


 


Mean Corpuscular Hemoglobin


Concent 34.5 % 


 


 


Red Cell Distribution Width 13.5 % 


 


Platelet Count 293 TH/MM3 


 


Mean Platelet Volume 6.9 FL 


 


Neutrophils (%) (Auto) 44.0 % 


 


Lymphocytes (%) (Auto) 45.5 % 


 


Monocytes (%) (Auto) 7.8 % 


 


Eosinophils (%) (Auto) 1.3 % 


 


Basophils (%) (Auto) 1.4 % 


 


Neutrophils # (Auto) 2.6 TH/MM3 


 


Lymphocytes # (Auto) 2.7 TH/MM3 


 


Monocytes # (Auto) 0.5 TH/MM3 


 


Eosinophils # (Auto) 0.1 TH/MM3 


 


Basophils # (Auto) 0.1 TH/MM3 


 


CBC Comment DIFF FINAL 


 


Differential Comment  


 


Prothrombin Time 10.0 SEC 


 


Prothromb Time International


Ratio 1.0 RATIO 


 


 


Activated Partial


Thromboplast Time 23.0 SEC 


 


 


Blood Urea Nitrogen 8 MG/DL 


 


Creatinine 0.68 MG/DL 


 


Random Glucose 152 MG/DL 


 


Calcium Level 8.3 MG/DL 


 


Magnesium Level 2.3 MG/DL 


 


Sodium Level 142 MEQ/L 


 


Potassium Level 3.5 MEQ/L 


 


Chloride Level 107 MEQ/L 


 


Carbon Dioxide Level 25.0 MEQ/L 


 


Anion Gap 10 MEQ/L 


 


Estimat Glomerular Filtration


Rate 88 ML/MIN 


 


 


Total Creatine Kinase 260 U/L 


 


Creatine Kinase MB 2.9 NG/ML 


 


Creatine Kinase MB % 1.1 % 


 


Troponin I


  LESS THAN 0.02


NG/ML











MDM


Medical Decision Making


Medical Screen Exam Complete:  Yes


Emergency Medical Condition:  Yes


Differential Diagnosis


ACS versus leaking thoracic aorta aneurysm versus muscular skeletal pain versus 

GERD


Narrative Course


4-year-old female with history coronary disease, thoracic aortic aneurysm, 

hypertension, hypothyroidism, presents today with complaints of chest pain.  

She reports that as sharp in her mid substernal area.  She was concerned this 

could be her aneurysm that is causing the pain.  Aneurysm does not appear to be 

leaking.  Is 4.5 cm in diameter.  The patient will be admitted to the chest 

pain center for rule out protocol.  I have ordered a TSH level.  It is pending 

at this time.





Diagnosis





 Primary Impression:  


 Chest pain, atypical


 Additional Impressions:  


 4.5 cm thoracic aortic aneurysm


 Coronary artery disease


 Hypertension


 Hypothyroidism





Admitting Information


Admitting Physician Requests:  Observation











Sacha Yap MD May 17, 2018 20:06

## 2018-05-17 NOTE — RADRPT
EXAM DATE/TIME:  05/17/2018 16:26 

 

[facility] COMPARISON:     

CHEST SINGLE AP, June 22, 2017, 10:41.

 

                     

INDICATIONS :     

Chest pain.

                     

 

MEDICAL HISTORY :            

Hypertension. Coronary artery disease. Thoracic aneurysm. A-fib. Fybromyalgia. Neuropathy. Hypoglycem
ia.   

 

SURGICAL HISTORY :        

Coronary stent. C-spine diskectomy.

 

ENCOUNTER:     

Initial                                        

 

ACUITY:     

1 day      

 

PAIN SCORE:     

8/10

 

LOCATION:     

Bilateral chest 

 

FINDINGS:     

No new focal pleural or parenchymal opacities. Cardiac silhouette is within normal limits. Redemonstr
ation of prominent aortic root similar to previous exams. Bony thorax is intact.

 

CONCLUSION:     

1. Stable dilatation of the aortic root.

2. No acute abnormality or significant interval change.

 

 

 Ambrosio Rodgers MD on May 17, 2018 at 16:41           

Board Certified Radiologist.

 This report was verified electronically.

## 2018-05-18 VITALS
TEMPERATURE: 97.6 F | OXYGEN SATURATION: 97 % | RESPIRATION RATE: 18 BRPM | HEART RATE: 69 BPM | SYSTOLIC BLOOD PRESSURE: 143 MMHG | DIASTOLIC BLOOD PRESSURE: 93 MMHG

## 2018-05-18 VITALS — OXYGEN SATURATION: 96 %

## 2018-05-18 VITALS
OXYGEN SATURATION: 94 % | DIASTOLIC BLOOD PRESSURE: 77 MMHG | TEMPERATURE: 97.3 F | HEART RATE: 70 BPM | RESPIRATION RATE: 16 BRPM | SYSTOLIC BLOOD PRESSURE: 133 MMHG

## 2018-05-18 VITALS — HEART RATE: 75 BPM

## 2018-05-18 RX ADMIN — Medication SCH ML: at 08:46

## 2018-05-18 NOTE — EKG
Date Performed: 2018       Time Performed: 22:33:04

 

PTAGE:      58 years

 

EKG:      Sinus rhythm 

 

 LOW QRS VOLTAGE IN PRECORDIAL LEADS MODERATE VOLTAGE CRITERIA FOR LVH, CONSIDER NORMAL VARIANT POSSI
BLE ANTERIOR MYOCARDIAL INFARCTION BORDERLINE ECG Since 

 

 PREVIOUS TRACING            , no significant change noted PREVIOUS TRACIN2018 17.45

 

DOCTOR:   Josselin Valdez  Interpretating Date/Time  2018 14:33:56

## 2018-05-18 NOTE — HHI.HP
HPI


Primary Care Physician


Pranay Hernandez M.D.


Chief Complaint


Chest pain and palpations


History of Present Illness


58 year old female with history of CAD, x1 cardiac stent, hyperlipidemia, and 

hypertension present to ER for further evaluation of nonexertional neck pain 

and palpations. Onset 3 pm. Described as heart "pounding fast and flipping/

flopping sensation." Accompanying symptom of tightness in face and neck. Took 

total of 3 Nitro SL, 5 minutes apart, taken at home. When symptoms did not 

improve she called 911. Discomfort 8/10. No radiation. Duration severe 

discomfort and palpations 45 minutes, reporting "pounding went away sometime in 

evening." Associated symptoms dyspnea and headache. No nausea, vomiting, or 

diaphoresis. No know precipitating factors. Relieving factors Metoprolol and 

morphine given in ER. Endorses running out of her Metoprolol 6 days ago, 

restarting yesterday morning. No current discomfort. Endorses similar pain in 

the past. Follows with Dr. Thompson. Endorses wearing a Holter monitor in the 

past. Most recent cardiac catheterization .





Review of Systems


General: No fatigue,weakness, fever, chills, or recent illness. Has been in her 

general state of health. 


HEENT: No HA, no vision changes, no nasal congestion or drainage, no dysphasia


CV: As stated above. No current pressure, chest pain, neck pain, palpitations, 

or dizziness.


RESP: No SOB, cough, wheeze, or recent respiratory illness


GI: No nausea, vomiting, bowel changes, diarrhea, constipation, pain, distention

, melena, or blood in the stool.  


: No dysuria, urgency, frequency


EXT: No lower leg edema, no paraesthesias


MS: No discomfort, injury, trauma, or change in ROM


NEURO: No difficulty with balance, LOC, motor/sensory deficits


PSYCH: No anxiety, depression, or suicidal ideation


SKIN: No rashes, no concerning lesions





Past Family Social History


Allergies:  


Coded Allergies:  


     acetaminophen (Unverified  Allergy, Severe, 18)


     ciprofloxacin (Unverified  Allergy, Severe, 18)


     nifedipine (Unverified  Allergy, Severe, 18)


     propoxyphene (Unverified  Allergy, Severe, 18)


     Sulfa (Sulfonamide Antibiotics) (Unverified  Allergy, Intermediate, HIVES, 

18)


     tizanidine (Unverified  Allergy, Intermediate, VOMITING, 18)


Past Medical History


CAD, x1 cardiac stent, hypertension, hyperlipidemia, fibromyalgia, thoracic 

aortic aneurysm, hypothyroidism, SVT, paroxysmal afib


Past Surgical History


, umbical hernia repair-, x1 cardiac stent


Reported Medications





Reported Meds & Active Scripts


Active


Reported


Flexeril (Cyclobenzaprine HCl) 10 Mg Tab 10 Mg PO TID


Gabapentin 300 Mg Cap 300 Mg PO TID


Amlodipine (Amlodipine Besylate) 5 Mg Tab 5 Mg PO DAILY


Cymbalta DR (Duloxetine HCl) 60 Mg Capdr 60 Mg PO BID


Losartan (Losartan Potassium) 50 Mg Tab 50 Mg PO BID


Metoprolol Tartrate 25 Mg Tab 75 Mg PO BID


Levothyroxine (Levothyroxine Sodium) 75 Mcg Tab 75 Mcg PO DAILY


Lansoprazole 30 Mg Capdr 30 Mg PO BID


Flonase Nasal Spray (Fluticasone Nasal Spray) 50 Mcg/Act Spray 2 Spray EACH 

NARE DAILY PRN


Allegra Allergy (Fexofenadine HCl) 180 Mg Tab 180 Mg PO DAILY PRN


Aviane (Levonorgestrel-Ethinyl Estradiol) 0.1-20 Mg-Mcg Tab 1 Tab PO DAILY


Aspirin Adult Low Strength (Aspirin) 81 Mg Tabdr 81 Mg PO HS


Active Ordered Medications





Current Medications








 Medications


  (Trade)  Dose


 Ordered  Sig/Letha


 Route  Start Time


 Stop Time Status Last Admin


 


  (NS Flush)  2 ml  UNSCH  PRN


 IV FLUSH  18 20:00


     


 


 


  (NS Flush)  2 ml  BID


 IV FLUSH  18 21:00


     


 


 


  (Zofran  Odt)  4 mg  Q6H  PRN


 PO  18 08:00


     


 


 


  (Nitrostat Sl)  0.4 mg  Q5M  PRN


 SL  18 07:45


     


 


 


  (Aspirin)  325 mg  DAILY


 PO  18 09:00


     


 








Social History


Known CAD, hypertension, and hyperlipidemia. No known diabetes. Not currently 

taking a statin, denies previous intolerance of statin. 


Lifelong nonsmoker. Denies alcohol or illegal drug use.


. Endorsed sedentary lifestyle. 





Past cardiac testing


16 Cardiac Catheterization (Dr Thompson) Conclusions 1. No significant 

coronary stenosis to explain her anginal symptoms. 2. Normal ventricular 

function. 3. Dilated aorta. Recommendations 1. Medical management. 2. We will 

discontinued Lozol due to the severe hypokalemia on presentation. 


14 Cardiac Catheterization (Dr. Miles) Conclusions 1. Widely patent mid 

left anterior descending coronary stent. 2. Normal left sided filling 

pressures. 


14 Cardiac Catheterization (Dr. Miles) Conclusions 1.  Subtotally 

occluded mid left anterior descending coronary artery. 2. Successful 

percutaneous intervention with bare-metal stenting to the mid left anterior 

descending coronary artery. 3.  Normal left ventricular systolic function. 4. 

Normal left-sided filling fractures.





Physical Exam


Vital Signs





Vital Signs








  Date Time  Temp Pulse Resp B/P (MAP) Pulse Ox O2 Delivery O2 Flow Rate FiO2


 


18 08:07     96   21


 


18 07:28 97.6 69 18 143/93 (110) 97   


 


18 03:06 97.3 70 16 133/77 (95) 94   


 


18 23:49 98.1 70 16 140/79 (99) 96   


 


18 22:00     98 Nasal Cannula 2.00 


 


18 21:24 98.2 76 18 140/84 (102) 98   


 


18 21:00        


 


18 20:26     95 Nasal Cannula 2.00 


 


18 18:25  107 20 146/67 (93) 95 Room Air 2.00 


 


18 18:23     96 Room Air 2.00 


 


18 17:23   18     


 


18 16:56  100 20 147/80 (102) 93 Room Air 2.00 


 


18 16:30     95 Nasal Cannula 2.00 


 


18 16:30  99 18 148/73 (98) 94 Room Air 2.00 


 


18 16:19 98.3 98 18 152/88 (109) 95   








Physical Exam


GENERAL: Alert WN, WD, NAD, pleasant, moderately obese  female


HEAD: NC, AT


EYES: Sclera clear, conjunctiva without injection, pupils equal and round


ENT: Mucous membranes pink and moist


CV: RRR, without murmur, rub, gallop, no JVD, S1-S2 no S3-S4.  Chest wall 

nontender with palpation.


RESP: Clear lungs throughout bilateral, no crackles, wheeze, rhonchi, 

symmetrical chest rise, nonlabored, able to speak in full sentences


ABD: Soft, NT, ND, no masses, positive bowel tones


EXT: Pulses +2x4, no dependent edema


MS: Normal tone x4 extremities, nontender, no obvious deformities, full range 

of motion


NEURO: CN II through CN XII grossly intact, motor strength 5/5


PSYCH: A+O x3, pleasant affect, appropriate speech, mood, insight and judgment


SKIN: Normal turgor, normal texture, no lesions, no rashes


Laboratory





Laboratory Tests








Test


  18


16:30 18


20:40 18


23:30


 


White Blood Count 6.0   


 


Red Blood Count 4.48   


 


Hemoglobin 13.7   


 


Hematocrit 39.7   


 


Mean Corpuscular Volume 88.5   


 


Mean Corpuscular Hemoglobin 30.6   


 


Mean Corpuscular Hemoglobin


Concent 34.5 


  


  


 


 


Red Cell Distribution Width 13.5   


 


Platelet Count 293   


 


Mean Platelet Volume 6.9   


 


Neutrophils (%) (Auto) 44.0   


 


Lymphocytes (%) (Auto) 45.5   


 


Monocytes (%) (Auto) 7.8   


 


Eosinophils (%) (Auto) 1.3   


 


Basophils (%) (Auto) 1.4   


 


Neutrophils # (Auto) 2.6   


 


Lymphocytes # (Auto) 2.7   


 


Monocytes # (Auto) 0.5   


 


Eosinophils # (Auto) 0.1   


 


Basophils # (Auto) 0.1   


 


CBC Comment DIFF FINAL   


 


Differential Comment    


 


Prothrombin Time 10.0   


 


Prothromb Time International


Ratio 1.0 


  


  


 


 


Activated Partial


Thromboplast Time 23.0 


  


  


 


 


Blood Urea Nitrogen 8   


 


Creatinine 0.68   


 


Random Glucose 152   


 


Calcium Level 8.3   


 


Magnesium Level 2.3   


 


Sodium Level 142   


 


Potassium Level 3.5   


 


Chloride Level 107   


 


Carbon Dioxide Level 25.0   


 


Anion Gap 10   


 


Estimat Glomerular Filtration


Rate 88 


  


  


 


 


Total Creatine Kinase 260  235  226 


 


Creatine Kinase MB 2.9  2.8  3.0 


 


Creatine Kinase MB % 1.1  1.2  1.3 


 


Troponin I LESS THAN 0.02  LESS THAN 0.02  LESS THAN 0.02 


 


Thyroid Stimulating Hormone


3rd Gen 


  


  5.240 


 








Result Diagram:  


18 1630                                                                   

             18 1630





Imaging





Last 48 hours Impressions








Chest X-Ray 18 1621 Signed





Impressions: 





 Service Date/Time:  Thursday, May 17, 2018 16:26 - CONCLUSION:  1. Stable 





 dilatation of the aortic root. 2. No acute abnormality or significant interval 





 change.    Ambrosio Rodgers MD 


 


Aorta CTA 18 0000 Signed





Impressions: 





 Service Date/Time:  Thursday, May 17, 2018 18:32 - CONCLUSION:  1. Aneurysmal 





 dilatation of the ascending thoracic aorta measuring 4.5 cm. 2. No dissection 

or 





 abdominal aneurysm is seen. 3. Hepatic steatosis. 4. Small 3 mm nonobstructing 





 left renal stone. 5. Colonic diverticula.     Bonifacio Basilio MD 








Course


EKG


Normal sinus rhythm, no ST-T segment changes





Caprini VTE Risk Assessment


Caprini VTE Risk Assessment:  Mod/High Risk (score >= 2)


Caprini Risk Assessment Model











 Point Value = 1          Point Value = 2  Point Value = 3  Point Value = 5


 


Age 41-60


Minor surgery


BMI > 25 kg/m2


Swollen legs


Varicose veins


Pregnancy or postpartum


History of unexplained or recurrent


   spontaneous 


Oral contraceptives or hormone


   replacement


Sepsis (< 1 month)


Serious lung disease, including


   pneumonia (< 1 month)


Abnormal pulmonary function


Acute myocardial infarction


Congestive heart failure (< 1 month)


History of inflammatory bowel disease


Medical patient at bed rest Age 61-74


Arthroscopic surgery


Major open surgery (> 45 min)


Laparoscopic surgery (> 45 min)


Malignancy


Confined to bed (> 72 hours)


Immobilizing plaster cast


Central venous access Age >= 75


History of VTE


Family history of VTE


Factor V Leiden


Prothrombin 41892B


Lupus anticoagulant


Anticardiolipin antibodies


Elevated serum homocysteine


Heparin-induced thrombocytopenia


Other congenital or acquired


   thrombophilia Stroke (< 1 month)


Elective arthroplasty


Hip, pelvis, or leg fracture


Acute spinal cord injury (< 1 month)








Prophylaxis Regimen











   Total Risk


Factor Score Risk Level Prophylaxis Regimen


 


0-1      Low Early ambulation


 


2 Moderate Order ONE of the following:


*Sequential Compression Device (SCD)


*Heparin 5000 units SQ BID


 


3-4 Higher Order ONE of the following medications:


*Heparin 5000 units SQ TID


*Enoxaparin/Lovenox 40 mg SQ daily (WT < 150 kg, CrCl > 30 mL/min)


*Enoxaparin/Lovenox 30 mg SQ daily (WT < 150 kg, CrCl > 10-29 mL/min)


*Enoxaparin/Lovenox 30 mg SQ BID (WT < 150 kg, CrCl > 30 mL/min)


AND/OR


*Sequential Compression Device (SCD)


 


5 or more Highest Order ONE of the following medications:


*Heparin 5000 units SQ TID (Preferred with Epidurals)


*Enoxaparin/Lovenox 40 mg SQ daily (WT < 150 kg, CrCl > 30 mL/min)


*Enoxaparin/Lovenox 30 mg SQ daily (WT < 150 kg, CrCl > 10-29 mL/min)


*Enoxaparin/Lovenox 30 mg SQ BID (WT < 150 kg, CrCl > 30 mL/min)


AND


*Sequential Compression Device (SCD)











Assessment and Plan


Assessment and Plan


#1 Chest pain-admitted to chest pain center. ACS ruled out with 3 sets of EKGs, 

cardiac enzymes, and monitor on telemetry overnight. Seen and evaluated by Dr. Valdez. Call will be placed to Dr. Thompson office to discuss plan of care, 

including possible chemical testing prior to discharge or discharge patient 

with follow up with Dr Thompson. Further disposition to follow after contacting 

patient's cardiologist. Patient verbalizes understanding and agreeable plan of 

care. Start statin therapy, instructed to notify both her PCP and cardiologist 

of additional statin therapy started. Verbalized understanding. 


#2 History of CAD-continue metoprolol, losartan, amlodipine





0910 Contacted [facility] office. Office staff reports Dr. Thompson out 

of office today and not on call, therefore office unable to contact Dr. Thompson 

for a return call. Notified Dr. Valdez, proceed with Lexiscan this 

morning. Patient made aware and agreeable to plan of care.











Luisa Teague May 18, 2018 08:15

## 2018-05-18 NOTE — RADRPT
EXAM DATE/TIME:  05/18/2018 11:50 

 

[facility] COMPARISON:     

MYOCARDIAL PERF PHARM SPECT, GATED W/EF, April 19, 2017, 9:16.

 

 

INDICATIONS :     

Mid chest pressure. Angina. Coronary artery disease.

                           

 

DOSE:     

26.3 mCi Tc99m Myoview at stress.

                     8.6 mCi Tc99m Myoview at rest.

                     0.4 mg Lexiscan

                       

 

 

STRESS SYMPTOMS:      

Flushed.

                       

 

 

EJECTION FRACTION:      

> 70%

                       

 

MEDICAL HISTORY :     

Hypertension.   

 

SURGICAL HISTORY :      

Coronary artery stent.   

 

ENCOUNTER:     

Initial

 

ACUITY:     

3 days

 

PAIN SCALE:     

3/10

 

LOCATION:      

Bilateral chest 

 

TECHNIQUE:     

The patient underwent pharmacologic stress with infusion of prescribed dose.  Continuous ECG tracing 
was monitored during stress.  Gated SPECT imaging was performed after stress and conventional SPECT i
maging was performed at rest.  The examination was performed on a SPECT/CT scanner, both attenuation 
and non-corrected datasets were reviewed.

 

FINDINGS:     

 

DISTRIBUTION:     

The maximum perfused segment at stress is in the anterior wall.

 

PERFUSION STUDY:     

The pattern of perfusion at stress is within normal limits.

 

GATED STUDY:     

There is intact wall motion and thickening without hypokinetic or dyskinetic segments. 

 

CONCLUSION:     

1. No definite stress-induced ischemia.

2. Intact wall motion with EF of >70%.

 

RISK CATEGORY:     Low (<1% Annual Mortality Rate)

 

 

 

 

 Ambrosio Rodgers MD on May 18, 2018 at 13:27           

Board Certified Radiologist.

 This report was verified electronically.

## 2018-05-18 NOTE — [facility].DCPOC
Discharge Care Plan


Diagnosis:  


(1) Atypical chest pain


(2) H/O heart artery stent


Goals to Promote Your Health


* To prevent worsening of your condition and complications


* To maintain your health at the optimal level


Directions to Meet Your Goals


*** Take your medications as prescribed


*** Follow your dietary instruction


*** Follow activity as directed








*** Keep your appointments as scheduled


*** Take your immunizations and boosters as scheduled


*** If your symptoms worsen call your PCP, if no PCP go to Urgent Care Center 

or Emergency Room***


*** Smoking is Dangerous to Your Health. Avoid second hand smoke***


***Call the 24-hour hour crisis hotline for domestic abuse at 1-101.478.6089***











Luisa Teague May 18, 2018 13:33

## 2018-05-18 NOTE — EKG
Date Performed: 2018       Time Performed: 17:45:25

 

PTAGE:      58 years

 

EKG:      Sinus rhythm 

 

 LOW QRS VOLTAGE IN PRECORDIAL LEADS POSSIBLE ANTERIOR MYOCARDIAL INFARCTION BORDERLINE ECG Since 

 

 PREVIOUS TRACING            , no significant change noted PREVIOUS TRACIN2017 17.06

 

DOCTOR:   Josselin Valdez  Interpretating Date/Time  2018 14:34:36

## 2018-05-18 NOTE — TR
Date Performed: 05/18/2018       Time Performed: 12:13:30

 

DOCTOR:      Josselin Valdez 

 

DRUG LIST:     

CLINICAL HISTORY:      ANGINA

REASON FOR TEST:     

REASON FOR ENDING:     

OBSERVATION:     

CONCLUSION:      Lexiscan stress test was performed under standard four minute protocol.  Radionuclid
e was injected one minute prior to ending the test. No electrocardiographic abormalities were present
 to suggest ischemia. Nuclear imaging and interpretation are pending.

COMMENTS:      No ischemia

## 2018-05-18 NOTE — EKG
Date Performed: 2018       Time Performed: 00:17:00

 

PTAGE:      58 years

 

EKG:      Sinus rhythm 

 

 INFERIOR MYOCARDIAL INFARCTION ABNORMAL ECG Since 

 

 PREVIOUS TRACING            , no significant change noted PREVIOUS TRACIN2018 22.33

 

DOCTOR:   Josselin Valdez  Interpretating Date/Time  2018 14:33:26